# Patient Record
Sex: FEMALE | Race: WHITE | NOT HISPANIC OR LATINO | Employment: FULL TIME | URBAN - NONMETROPOLITAN AREA
[De-identification: names, ages, dates, MRNs, and addresses within clinical notes are randomized per-mention and may not be internally consistent; named-entity substitution may affect disease eponyms.]

---
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
## 2019-12-29 ENCOUNTER — HOSPITAL ENCOUNTER (EMERGENCY)
Facility: HOSPITAL | Age: 19
Discharge: HOME | End: 2019-12-29
Attending: EMERGENCY MEDICINE | Admitting: EMERGENCY MEDICINE

## 2019-12-29 VITALS
BODY MASS INDEX: 40.98 KG/M2 | TEMPERATURE: 98.5 F | OXYGEN SATURATION: 99 % | RESPIRATION RATE: 16 BRPM | HEIGHT: 65 IN | HEART RATE: 78 BPM | DIASTOLIC BLOOD PRESSURE: 79 MMHG | SYSTOLIC BLOOD PRESSURE: 126 MMHG | WEIGHT: 246 LBS

## 2019-12-29 DIAGNOSIS — F41.9 ANXIETY: Primary | ICD-10-CM

## 2019-12-29 PROCEDURE — 99282 EMERGENCY DEPT VISIT SF MDM: CPT

## 2020-11-13 ENCOUNTER — OFFICE VISIT (OUTPATIENT)
Dept: INTERNAL MEDICINE | Facility: CLINIC | Age: 20
End: 2020-11-13

## 2020-11-13 ENCOUNTER — RESULTS ENCOUNTER (OUTPATIENT)
Dept: INTERNAL MEDICINE | Facility: CLINIC | Age: 20
End: 2020-11-13

## 2020-11-13 VITALS
SYSTOLIC BLOOD PRESSURE: 108 MMHG | WEIGHT: 245 LBS | DIASTOLIC BLOOD PRESSURE: 76 MMHG | HEART RATE: 74 BPM | OXYGEN SATURATION: 99 % | BODY MASS INDEX: 41.83 KG/M2 | TEMPERATURE: 97.8 F | HEIGHT: 64 IN

## 2020-11-13 DIAGNOSIS — Z11.59 NEED FOR HEPATITIS C SCREENING TEST: ICD-10-CM

## 2020-11-13 DIAGNOSIS — Z76.89 ENCOUNTER TO ESTABLISH CARE: Primary | ICD-10-CM

## 2020-11-13 DIAGNOSIS — F41.0 PANIC ATTACK: ICD-10-CM

## 2020-11-13 DIAGNOSIS — F41.8 DEPRESSION WITH ANXIETY: ICD-10-CM

## 2020-11-13 DIAGNOSIS — R41.840 DIFFICULTY CONCENTRATING: ICD-10-CM

## 2020-11-13 DIAGNOSIS — R53.82 CHRONIC FATIGUE: ICD-10-CM

## 2020-11-13 DIAGNOSIS — R29.898 RIGHT HAND WEAKNESS: ICD-10-CM

## 2020-11-13 PROBLEM — N92.0 MENORRHAGIA WITH REGULAR CYCLE: Status: ACTIVE | Noted: 2020-11-13

## 2020-11-13 PROBLEM — N93.8 DYSFUNCTIONAL UTERINE BLEEDING: Status: ACTIVE | Noted: 2020-11-13

## 2020-11-13 PROCEDURE — 99204 OFFICE O/P NEW MOD 45 MIN: CPT | Performed by: PHYSICIAN ASSISTANT

## 2020-11-13 RX ORDER — HYDROXYZINE HYDROCHLORIDE 25 MG/1
25 TABLET, FILM COATED ORAL 3 TIMES DAILY PRN
COMMUNITY
End: 2020-12-22

## 2020-11-13 RX ORDER — DULOXETIN HYDROCHLORIDE 30 MG/1
CAPSULE, DELAYED RELEASE ORAL
COMMUNITY
Start: 2020-10-30 | End: 2020-12-14

## 2020-11-13 RX ORDER — IBUPROFEN 200 MG
200 TABLET ORAL EVERY 6 HOURS PRN
COMMUNITY

## 2020-11-13 NOTE — PROGRESS NOTES
Subjective   Hanny Carbone is a 20 y.o. female who presents to [facility].    Chief Complaint   Patient presents with   • [facility]     right hand issue, panic attack       HPI: She is concerned with loss of control of hand strength since school started mid-August. Having trouble writing. She does endorse pressing hard when writing and also writes a lot. No trouble feeling the writing utensil. Worsened since onset. Does not worsen during writing but is present the entire time. No wrist, elbow, shoulder or neck pain. No known injury. She sometimes gets some numbness and tingling in the right forearm during sleep. She is right hand dominant. No trouble with left hand. 2-3 years ago she had numbness in hands and arms when awaking which resolved when she changed her sleep position.     She has been seeing Chanda Last at [facility] for 1 year. Unofficial diagnosis of ADHD currently. Having trouble focusing and completing tasks which is making college challenging. She started guanfacine in early October which made her more panic prone and depressed so then 1 month later she started Cymbalta. 1 week ago (5 days after starting Cymbalta) she had a panic attack and she feels it may be due to Cymbalta. She also had 4-5 day of feeling disassociation prior to the panic attack which resolved following the panic attack. She was also under increased stress at the time of the panic attack. During the panic attack she had around 15 minutes where her whole body started clenching up, her heart was racing, shaking, crying, hyperventilating, and felt like she couldn't breathe. Takes hydroxyzine prn (rarely) for anxiety and sleep.  She has previously tried and failed Prozac, Zoloft, Effexor and Lexapro.  She feels all medications have ever done is make her tired.  She does occasionally feel as though she would be better off dead or hurting herself in someway.    Last labs around 2 years ago.           The  following portions of the patient's history were reviewed and updated as appropriate: She  has a past medical history of Anemia, Anxiety, Depression, IBS (irritable bowel syndrome), and Scoliosis.  She does not have any pertinent problems on file.  She  has no past surgical history on file.  Her family history includes Diabetes in an other family member; Mental illness in an other family member; Migraines in an other family member; Obesity in an other family member; Osteosarcoma in her brother.  She  reports that she has been smoking. She has never used smokeless tobacco. She reports current alcohol use. She reports current drug use. Drug: Marijuana.    Current Outpatient Medications   Medication Sig Dispense Refill   • DULoxetine (CYMBALTA) 30 MG capsule      • guanFACINE HCl ER (INTUNIV) 1 MG tablet sustained-release 24 hour      • hydrOXYzine (ATARAX) 25 MG tablet Take 25 mg by mouth 3 (Three) Times a Day As Needed.     • ibuprofen (ADVIL,MOTRIN) 200 MG tablet Take 200 mg by mouth Every 6 (Six) Hours As Needed for Mild Pain .     • VITAMIN D PO Take  by mouth.       No current facility-administered medications for this visit.        PHQ-9 Depression Screening  Little interest or pleasure in doing things? 3   Feeling down, depressed, or hopeless? 2   Trouble falling or staying asleep, or sleeping too much? 3   Feeling tired or having little energy? 3   Poor appetite or overeating? 3   Feeling bad about yourself - or that you are a failure or have let yourself or your family down? 3   Trouble concentrating on things, such as reading the newspaper or watching television? 3   Moving or speaking so slowly that other people could have noticed? Or the opposite - being so fidgety or restless that you have been moving around a lot more than usual? 3   Thoughts that you would be better off dead, or of hurting yourself in some way? 1   PHQ-9 Total Score 24   If you checked off any problems, how difficult have these problems  made it for you to do your work, take care of things at home, or get along with other people? Extremely dIfficult         Review of Systems  Review of Systems   Constitutional: Positive for fatigue. Negative for activity change, appetite change, chills, diaphoresis, fever, unexpected weight gain and unexpected weight loss.   HENT: Negative for congestion, dental problem, ear pain, mouth sores, postnasal drip, rhinorrhea, sinus pressure, sneezing, sore throat, swollen glands, trouble swallowing and voice change.    Eyes: Negative for blurred vision, double vision, pain, discharge, redness, itching and visual disturbance.   Respiratory: Negative for cough, choking, chest tightness, shortness of breath and wheezing.    Cardiovascular: Negative for chest pain, palpitations and leg swelling.   Gastrointestinal: Negative for abdominal distention, abdominal pain, blood in stool, constipation, diarrhea, nausea, rectal pain, vomiting, GERD and indigestion.   Endocrine: Negative for cold intolerance, heat intolerance, polydipsia, polyphagia and polyuria.   Genitourinary: Negative for breast discharge, breast lump, breast pain, decreased libido, decreased urine volume, difficulty urinating, dysuria, flank pain, frequency, hematuria, pelvic pain, urgency, vaginal bleeding and vaginal pain.   Musculoskeletal: Negative for arthralgias, back pain, gait problem, myalgias and neck pain.   Skin: Negative for color change, rash and skin lesions.   Allergic/Immunologic: Negative for environmental allergies and immunocompromised state.   Neurological: Negative for dizziness, tremors, facial asymmetry, speech difficulty, weakness, light-headedness, numbness, headache, memory problem and confusion.        Loss of  strength     Hematological: Negative for adenopathy. Does not bruise/bleed easily.   Psychiatric/Behavioral: Positive for agitation, decreased concentration, dysphoric mood, sleep disturbance and depressed mood. Negative for  "behavioral problems, hallucinations, self-injury, suicidal ideas, negative for hyperactivity and stress. The patient is nervous/anxious.          Objective    /76   Pulse 74   Temp 97.8 °F (36.6 °C)   Ht 162.6 cm (64.02\")   Wt 111 kg (245 lb)   SpO2 99%   BMI 42.03 kg/m²   Physical Exam  Vitals signs and nursing note reviewed.   Constitutional:       General: She is not in acute distress.     Appearance: She is well-developed. She is obese. She is not diaphoretic.   HENT:      Head: Normocephalic and atraumatic.   Eyes:      General: No visual field deficit or scleral icterus.        Right eye: No discharge.         Left eye: No discharge.      Extraocular Movements: Extraocular movements intact.      Conjunctiva/sclera: Conjunctivae normal.      Pupils: Pupils are equal, round, and reactive to light.   Neck:      Musculoskeletal: Normal range of motion and neck supple. No neck rigidity or muscular tenderness.   Cardiovascular:      Rate and Rhythm: Normal rate and regular rhythm.      Heart sounds: Normal heart sounds. No murmur. No friction rub. No gallop.    Pulmonary:      Effort: Pulmonary effort is normal. No respiratory distress.      Breath sounds: Normal breath sounds. No wheezing, rhonchi or rales.   Chest:      Chest wall: No tenderness.   Abdominal:      General: Bowel sounds are normal.      Palpations: Abdomen is soft.      Tenderness: There is no abdominal tenderness. There is no right CVA tenderness, left CVA tenderness or rebound.   Musculoskeletal: Normal range of motion.         General: No tenderness or deformity.      Right shoulder: She exhibits normal range of motion, no tenderness, no swelling, no crepitus, no laceration, no pain and normal pulse.      Right wrist: She exhibits normal range of motion, no tenderness, no bony tenderness, no swelling, no effusion, no crepitus, no deformity and no laceration.      Cervical back: She exhibits normal range of motion, no tenderness, no " bony tenderness, no deformity, no pain, no spasm and normal pulse.      Right upper arm: She exhibits no tenderness, no swelling and no edema.      Right forearm: She exhibits no tenderness, no bony tenderness, no edema and no laceration.      Right hand: She exhibits normal range of motion, no tenderness, no bony tenderness, normal two-point discrimination, normal capillary refill, no deformity, no laceration and no swelling. Normal sensation noted. Decreased sensation is not present in the ulnar distribution, is not present in the medial distribution and is not present in the radial distribution. Normal strength noted. She exhibits no finger abduction, no thumb/finger opposition and no wrist extension trouble.      Right lower leg: No edema.      Left lower leg: No edema.   Lymphadenopathy:      Cervical: No cervical adenopathy.   Skin:     General: Skin is warm and dry.      Capillary Refill: Capillary refill takes less than 2 seconds.      Findings: No lesion or rash.   Neurological:      General: No focal deficit present.      Mental Status: She is alert and oriented to person, place, and time.      Cranial Nerves: Cranial nerves are intact. No cranial nerve deficit, dysarthria or facial asymmetry.      Sensory: Sensation is intact. No sensory deficit.      Motor: Motor function is intact. No weakness, tremor, atrophy, abnormal muscle tone, seizure activity or pronator drift.      Coordination: Coordination is intact. Romberg sign negative. Coordination normal. Finger-Nose-Finger Test and Heel to Shin Test normal. Rapid alternating movements normal.      Gait: Gait is intact. Gait and tandem walk normal.      Deep Tendon Reflexes: Reflexes normal.      Reflex Scores:       Tricep reflexes are 2+ on the right side and 2+ on the left side.       Bicep reflexes are 2+ on the right side and 2+ on the left side.       Brachioradialis reflexes are 2+ on the right side and 2+ on the left side.  Psychiatric:          Attention and Perception: Attention and perception normal.         Mood and Affect: Mood is anxious and depressed. Mood is not elated. Affect is not angry, tearful or inappropriate.         Speech: Speech normal.         Behavior: Behavior normal. Behavior is cooperative.         Thought Content: Thought content normal.         Cognition and Memory: Cognition and memory normal.         Judgment: Judgment normal.               Assessment/Plan      Diagnosis Plan   1. Encounter to establish care     2. Panic attack  Genetic Testing - Blood,    Ambulatory Referral to Psychiatry     3. Depression with anxiety  TSH    T4, Free    Genetic Testing - Blood,    Ambulatory Referral to Psychiatry     Patient screened positive for depression based on a PHQ-9 score of 24 on 11/13/2020. Follow-up recommendations include: Referral to Mental Health specialist.       4. Difficulty concentrating  T4, Free    Ambulatory Referral to Psychiatry     5. Chronic fatigue  TSH    T4, Free    CBC (No Diff)    Comprehensive Metabolic Panel    Vitamin D 25 Hydroxy     6. Right hand weakness  CBC (No Diff)    Comprehensive Metabolic Panel    Vitamin B12    No focal neurological deficit noted.  Recommended she obtain pencil  for daily use.  If symptoms worsen or persist she will notify me.     7. Need for hepatitis C screening test  Hepatitis C Antibody           Return in about 1 month (around 12/13/2020) for Annual physical.             PRATIK Bland  11/13/2020  09:23 EST

## 2020-11-14 LAB
25(OH)D3+25(OH)D2 SERPL-MCNC: 22.3 NG/ML (ref 30–100)
ALBUMIN SERPL-MCNC: 4.5 G/DL (ref 3.5–5.2)
ALBUMIN/GLOB SERPL: 2.1 G/DL
ALP SERPL-CCNC: 65 U/L (ref 39–117)
ALT SERPL-CCNC: 14 U/L (ref 1–33)
AST SERPL-CCNC: 11 U/L (ref 1–32)
BILIRUB SERPL-MCNC: 0.2 MG/DL (ref 0–1.2)
BUN SERPL-MCNC: 11 MG/DL (ref 6–20)
BUN/CREAT SERPL: 13.4 (ref 7–25)
CALCIUM SERPL-MCNC: 9.6 MG/DL (ref 8.6–10.5)
CHLORIDE SERPL-SCNC: 102 MMOL/L (ref 98–107)
CO2 SERPL-SCNC: 26.7 MMOL/L (ref 22–29)
CREAT SERPL-MCNC: 0.82 MG/DL (ref 0.57–1)
ERYTHROCYTE [DISTWIDTH] IN BLOOD BY AUTOMATED COUNT: 13.4 % (ref 12.3–15.4)
GLOBULIN SER CALC-MCNC: 2.1 GM/DL
GLUCOSE SERPL-MCNC: 89 MG/DL (ref 65–99)
HCT VFR BLD AUTO: 40.2 % (ref 34–46.6)
HCV AB S/CO SERPL IA: 0.1 S/CO RATIO (ref 0–0.9)
HGB BLD-MCNC: 13 G/DL (ref 12–15.9)
MCH RBC QN AUTO: 26.5 PG (ref 26.6–33)
MCHC RBC AUTO-ENTMCNC: 32.3 G/DL (ref 31.5–35.7)
MCV RBC AUTO: 81.9 FL (ref 79–97)
PLATELET # BLD AUTO: 323 10*3/MM3 (ref 140–450)
POTASSIUM SERPL-SCNC: 4.6 MMOL/L (ref 3.5–5.2)
PROT SERPL-MCNC: 6.6 G/DL (ref 6–8.5)
RBC # BLD AUTO: 4.91 10*6/MM3 (ref 3.77–5.28)
SODIUM SERPL-SCNC: 141 MMOL/L (ref 136–145)
T4 FREE SERPL-MCNC: 1.1 NG/DL (ref 0.93–1.7)
TSH SERPL DL<=0.005 MIU/L-ACNC: 1.3 UIU/ML (ref 0.27–4.2)
VIT B12 SERPL-MCNC: 251 PG/ML (ref 211–946)
WBC # BLD AUTO: 8.62 10*3/MM3 (ref 3.4–10.8)

## 2020-11-16 DIAGNOSIS — E55.9 VITAMIN D DEFICIENCY: ICD-10-CM

## 2020-11-16 DIAGNOSIS — E53.8 B12 DEFICIENCY: Primary | ICD-10-CM

## 2020-11-16 RX ORDER — ERGOCALCIFEROL 1.25 MG/1
50000 CAPSULE ORAL WEEKLY
Qty: 4 CAPSULE | Refills: 3 | Status: SHIPPED | OUTPATIENT
Start: 2020-11-16 | End: 2021-01-21 | Stop reason: SDUPTHER

## 2020-12-14 ENCOUNTER — OFFICE VISIT (OUTPATIENT)
Dept: INTERNAL MEDICINE | Facility: CLINIC | Age: 20
End: 2020-12-14

## 2020-12-14 VITALS
DIASTOLIC BLOOD PRESSURE: 68 MMHG | TEMPERATURE: 97.8 F | HEART RATE: 79 BPM | BODY MASS INDEX: 41.32 KG/M2 | OXYGEN SATURATION: 98 % | HEIGHT: 64 IN | WEIGHT: 242 LBS | SYSTOLIC BLOOD PRESSURE: 116 MMHG

## 2020-12-14 DIAGNOSIS — M41.9 SCOLIOSIS OF THORACOLUMBAR SPINE, UNSPECIFIED SCOLIOSIS TYPE: ICD-10-CM

## 2020-12-14 DIAGNOSIS — R29.898 RIGHT HAND WEAKNESS: ICD-10-CM

## 2020-12-14 DIAGNOSIS — K02.9 DENTAL CARIES: ICD-10-CM

## 2020-12-14 DIAGNOSIS — F41.8 DEPRESSION WITH ANXIETY: ICD-10-CM

## 2020-12-14 DIAGNOSIS — K08.89 PAIN, DENTAL: ICD-10-CM

## 2020-12-14 DIAGNOSIS — Z00.00 ANNUAL PHYSICAL EXAM: Primary | ICD-10-CM

## 2020-12-14 DIAGNOSIS — E66.01 CLASS 3 SEVERE OBESITY DUE TO EXCESS CALORIES WITHOUT SERIOUS COMORBIDITY WITH BODY MASS INDEX (BMI) OF 40.0 TO 44.9 IN ADULT (HCC): ICD-10-CM

## 2020-12-14 PROCEDURE — 99395 PREV VISIT EST AGE 18-39: CPT | Performed by: PHYSICIAN ASSISTANT

## 2020-12-14 RX ORDER — DULOXETIN HYDROCHLORIDE 60 MG/1
CAPSULE, DELAYED RELEASE ORAL
COMMUNITY
Start: 2020-11-30 | End: 2020-12-22

## 2020-12-14 NOTE — PROGRESS NOTES
Subjective   Hanny Carbone is a 20 y.o. female and is here for a comprehensive physical exam. The patient reports no new problems.    HPI: Cymbalta dose increased by behavioral health around 2 weeks ago which she is not sure she likes.     She has started vitamin D and B12 as directed since labs were reviewed 1 month ago. Fatigue has improved somewhat.    She reports being diagnosed with scoliosis 3 years ago, she is unsure if she needs to see a specialist. Some pain at times.     Previously discussed right hand  weakness has improved some but not fully.       Health Habits:  Eye exam within last 2 years? Yes.   Dental exam every 6 months? No, she will schedule.   Exercise habits: attempting to be more active   Healthy diet? Attempting, needs work.    The ASCVD Risk score (Ulysses ESTELLA Jr., et al., 2013) failed to calculate for the following reasons:    The 2013 ASCVD risk score is only valid for ages 40 to 79        Do you take any herbs or supplements that were not prescribed by a doctor? yes  Are you taking calcium supplements? No  Are you taking aspirin daily? No     History:  LMP: No LMP recorded.  Menopause: No  Last pap date: none, never sexually active  Family history of breast or ovarian cancer: no         OB History   No obstetric history on file.      reports never being sexually active.        The following portions of the patient's history were reviewed and updated as appropriate: She  has a past medical history of Anemia, Anxiety, Depression, IBS (irritable bowel syndrome), and Scoliosis.  She does not have any pertinent problems on file.  She  has no past surgical history on file.  Her family history includes Diabetes in an other family member; Mental illness in an other family member; Migraines in an other family member; Obesity in an other family member; Osteosarcoma in her brother.  She  reports that she has been smoking. She has never used smokeless tobacco. She reports current alcohol use.  She reports current drug use. Drug: Marijuana.  Current Outpatient Medications   Medication Sig Dispense Refill   • cyanocobalamin ([facility] Vitamin B-12) 1000 MCG tablet Take 1 tablet by mouth Daily. 30 tablet 11   • guanFACINE HCl ER (INTUNIV) 1 MG tablet sustained-release 24 hour      • hydrOXYzine (ATARAX) 25 MG tablet Take 25 mg by mouth 3 (Three) Times a Day As Needed.     • ibuprofen (ADVIL,MOTRIN) 200 MG tablet Take 200 mg by mouth Every 6 (Six) Hours As Needed for Mild Pain .     • vitamin D (ERGOCALCIFEROL) 1.25 MG (99493 UT) capsule capsule Take 1 capsule by mouth 1 (One) Time Per Week. 4 capsule 3   • VITAMIN D PO Take  by mouth.     • DULoxetine (CYMBALTA) 60 MG capsule        No current facility-administered medications for this visit.        Review of Systems  Do you have pain that bothers you in your daily life? Not every day    Review of Systems   Constitutional: Positive for fatigue. Negative for activity change, appetite change, chills, diaphoresis, fever, unexpected weight gain and unexpected weight loss.   HENT: Positive for dental problem. Negative for congestion, ear pain, mouth sores, postnasal drip, rhinorrhea, sinus pressure, sneezing, sore throat, swollen glands, trouble swallowing and voice change.    Eyes: Negative for blurred vision, double vision, pain, discharge, redness, itching and visual disturbance.   Respiratory: Negative for cough, choking, chest tightness, shortness of breath and wheezing.    Cardiovascular: Negative for chest pain, palpitations and leg swelling.   Gastrointestinal: Negative for abdominal distention, abdominal pain, blood in stool, constipation, diarrhea, nausea, rectal pain, vomiting, GERD and indigestion.   Endocrine: Negative for cold intolerance, heat intolerance, polydipsia, polyphagia and polyuria.   Genitourinary: Negative for breast discharge, breast lump, breast pain, decreased libido, decreased urine volume, difficulty urinating, dysuria, flank pain,  "frequency, hematuria, pelvic pain, urgency, vaginal bleeding and vaginal pain.   Musculoskeletal: Positive for arthralgias and back pain. Negative for gait problem, myalgias and neck pain.   Skin: Negative for color change, rash and skin lesions.   Allergic/Immunologic: Negative for environmental allergies and immunocompromised state.   Neurological: Positive for weakness (right hand,  strength). Negative for dizziness, tremors, facial asymmetry, speech difficulty, light-headedness, numbness, headache, memory problem and confusion.        Loss of  strength     Hematological: Negative for adenopathy. Does not bruise/bleed easily.   Psychiatric/Behavioral: Positive for agitation, decreased concentration, dysphoric mood, sleep disturbance, depressed mood and stress. Negative for behavioral problems, hallucinations, self-injury, suicidal ideas and negative for hyperactivity. The patient is nervous/anxious.          Objective   /68   Pulse 79   Temp 97.8 °F (36.6 °C)   Ht 162.6 cm (64.02\")   Wt 110 kg (242 lb)   SpO2 98%   BMI 41.52 kg/m²     Physical Exam  Vitals signs and nursing note reviewed.   Constitutional:       General: She is not in acute distress.     Appearance: She is well-developed. She is morbidly obese. She is not ill-appearing, toxic-appearing or diaphoretic.      Interventions: Face mask in place.   HENT:      Head: Normocephalic and atraumatic.      Right Ear: External ear normal.      Left Ear: External ear normal.   Eyes:      General: No scleral icterus.     Conjunctiva/sclera: Conjunctivae normal.      Pupils: Pupils are equal, round, and reactive to light.   Neck:      Musculoskeletal: Normal range of motion and neck supple. No neck rigidity or muscular tenderness.      Thyroid: No thyromegaly.   Cardiovascular:      Rate and Rhythm: Normal rate and regular rhythm.      Heart sounds: Normal heart sounds. No murmur. No friction rub. No gallop.    Pulmonary:      Effort: " Pulmonary effort is normal. No respiratory distress.      Breath sounds: Normal breath sounds. No wheezing or rales.   Chest:      Chest wall: No tenderness.   Abdominal:      General: Bowel sounds are normal. There is no distension.      Palpations: Abdomen is soft. There is no mass.      Tenderness: There is no abdominal tenderness. There is no right CVA tenderness, left CVA tenderness or rebound.   Musculoskeletal:         General: Deformity present. No swelling or tenderness.      Right lower leg: No edema.      Left lower leg: No edema.      Comments: Scoliosis noted.   Double jointed in many joints.   Lymphadenopathy:      Cervical: No cervical adenopathy.   Skin:     General: Skin is warm and dry.      Capillary Refill: Capillary refill takes less than 2 seconds.      Coloration: Skin is not pale.      Findings: No lesion or rash.   Neurological:      General: No focal deficit present.      Mental Status: She is alert and oriented to person, place, and time.      Cranial Nerves: No cranial nerve deficit.      Sensory: No sensory deficit.      Gait: Gait normal.      Deep Tendon Reflexes: Reflexes normal.   Psychiatric:         Mood and Affect: Mood normal.         Behavior: Behavior normal. Behavior is cooperative.         Thought Content: Thought content normal.         Judgment: Judgment normal.            Assessment/Plan   Healthy female exam.     1.    Diagnosis Plan   1. Annual physical exam     2. Class 3 severe obesity due to excess calories without serious comorbidity with body mass index (BMI) of 40.0 to 44.9 in adult (CMS/[facility])  Patient's Body mass index is 41.52 kg/m². BMI is above normal parameters. Recommendations include: exercise counseling and nutrition counseling.     3. Depression with anxiety  Managed by behavioral health.      4. Dental caries  Ambulatory Referral to Dentistry   5. Pain, dental  Ambulatory Referral to Dentistry     6. Right hand weakness  Ambulatory Referral to Occupational  Therapy     7. Scoliosis of thoracolumbar spine, unspecified scoliosis type  XR spine scoliosis ap standing         2. Patient Counseling:  --Nutrition: Stressed importance of moderation in sodium/caffeine intake, saturated fat and cholesterol, caloric balance, sufficient intake of fresh fruits, vegetables, fiber, calcium, iron, and 1 g folate supplementation if of childbearing age.   --Discussed the issue of calcium supplement, and the daily use of baby aspirin if applicable.             --Mammogram recommended every 2 years from age 40-49 and yearly beginning at age 50.  --Exercise: Stressed the importance of regular exercise.   --Substance Abuse: Discussed cessation/primary prevention of tobacco (if applicable), alcohol, or other drug use (if applicable); driving or other dangerous activities under the influence; availability of treatment for abuse.    --Sexuality: Discussed sexually transmitted diseases, partner selection, use of condoms, avoidance of unintended pregnancy  and contraceptive alternatives.   --Injury prevention: Discussed safety belts, safety helmets, smoke detector, smoking near bedding or upholstery.   --Dental health: Discussed importance of regular tooth brushing, flossing, and dental visits every 6 months.  --Immunizations reviewed.  --Discussed benefits of screening colonoscopy (if applicable).  --After hours service discussed with patient.    3. Discussed the patient's BMI with her.  The BMI is above average; BMI management plan is completed  4. Return in about 1 year (around 12/14/2021) for Annual physical.         PRATIK Bland  12/14/2020  16:32 EST

## 2020-12-15 ENCOUNTER — HOSPITAL ENCOUNTER (OUTPATIENT)
Dept: GENERAL RADIOLOGY | Facility: HOSPITAL | Age: 20
Discharge: HOME | End: 2020-12-15
Admitting: PHYSICIAN ASSISTANT

## 2020-12-15 PROCEDURE — 72081 X-RAY EXAM ENTIRE SPI 1 VW: CPT

## 2020-12-22 ENCOUNTER — OFFICE VISIT (OUTPATIENT)
Dept: BEHAVIORAL HEALTH | Facility: CLINIC | Age: 20
End: 2020-12-22

## 2020-12-22 VITALS
BODY MASS INDEX: 41.32 KG/M2 | DIASTOLIC BLOOD PRESSURE: 70 MMHG | WEIGHT: 242 LBS | OXYGEN SATURATION: 97 % | TEMPERATURE: 97.7 F | SYSTOLIC BLOOD PRESSURE: 116 MMHG | HEIGHT: 64 IN | HEART RATE: 82 BPM

## 2020-12-22 DIAGNOSIS — F41.9 ANXIETY: ICD-10-CM

## 2020-12-22 DIAGNOSIS — F90.2 ADHD (ATTENTION DEFICIT HYPERACTIVITY DISORDER), COMBINED TYPE: Primary | ICD-10-CM

## 2020-12-22 PROCEDURE — 90792 PSYCH DIAG EVAL W/MED SRVCS: CPT | Performed by: NURSE PRACTITIONER

## 2020-12-22 RX ORDER — FEXOFENADINE HYDROCHLORIDE 60 MG/1
60 TABLET, FILM COATED ORAL DAILY
COMMUNITY

## 2020-12-22 RX ORDER — METHYLPHENIDATE HYDROCHLORIDE 10 MG/1
TABLET ORAL
Qty: 90 TABLET | Refills: 0 | Status: SHIPPED | OUTPATIENT
Start: 2020-12-22 | End: 2021-01-21 | Stop reason: SDDI

## 2020-12-22 RX ORDER — DULOXETIN HYDROCHLORIDE 30 MG/1
30 CAPSULE, DELAYED RELEASE ORAL DAILY
Qty: 30 CAPSULE | Refills: 2 | Status: SHIPPED | OUTPATIENT
Start: 2020-12-22 | End: 2021-02-25

## 2020-12-22 NOTE — PROGRESS NOTES
Patient Name: Hanny Carbone  MRN: [id number]   :  2000     Referring Physician: Alexsandra Abreu PA    Chief Complaint:     ICD-10-CM ICD-9-CM   1. ADHD (attention deficit hyperactivity disorder), combined type  F90.2 314.01   2. Anxiety  F41.9 300.00       HPI:   Hanny Carbone is a 20 y.o. female who is here today for initial evaluation of ADHD and Anxiety .  Patient states she has a therapist she sees weekly.  She was also seeing somebody for medication management however they could not go farther in her treatment due to their licensing.  Patient is very frustrated and feels she would feel better if she had a specific diagnosis.  Patient has tried Prozac, Zoloft, Lexapro, Effexor, and guanfacine.  Patient is currently on Cymbalta 60 mg however states she feels worse now than it was increased to 60 from 30.  Does not have good grades.  Falls behind very easily.  Does not get things completed.  Can try to listen and misses part of the lecture.  Mind wanders a lot.    Past Medical History:   Past Medical History:   Diagnosis Date   • Anemia    • Anxiety    • Depression    • IBS (irritable bowel syndrome)    • Scoliosis        Past Surgical History:   History reviewed. No pertinent surgical history.    Social History:   Social History     Socioeconomic History   • Marital status: Single     Spouse name: Not on file   • Number of children: Not on file   • Years of education: Not on file   • Highest education level: Not on file   Tobacco Use   • Smoking status: Current Every Day Smoker   • Smokeless tobacco: Never Used   • Tobacco comment: vape, planning to quit   Substance and Sexual Activity   • Alcohol use: Yes     Frequency: Monthly or less     Drinks per session: 1 or 2     Binge frequency: Never   • Drug use: Yes     Types: Marijuana   • Sexual activity: Never       Family History:  Family History   Problem Relation Age of Onset   • Osteosarcoma Brother    • Diabetes Other    • Mental illness Other    •  Migraines Other    • Obesity Other        Allergy:  No Known Allergies    Current Medications:   Current Outpatient Medications   Medication Sig Dispense Refill   • cyanocobalamin ([facility] Vitamin B-12) 1000 MCG tablet Take 1 tablet by mouth Daily. 30 tablet 11   • fexofenadine (ALLEGRA) 60 MG tablet Take 60 mg by mouth Daily.     • ibuprofen (ADVIL,MOTRIN) 200 MG tablet Take 200 mg by mouth Every 6 (Six) Hours As Needed for Mild Pain .     • vitamin D (ERGOCALCIFEROL) 1.25 MG (86031 UT) capsule capsule Take 1 capsule by mouth 1 (One) Time Per Week. 4 capsule 3   • DULoxetine (Cymbalta) 30 MG capsule Take 1 capsule by mouth Daily. 30 capsule 2   • methylphenidate (RITALIN) 10 MG tablet Take 10 mg orally every morning, mid day and late afternoon. 90 tablet 0     No current facility-administered medications for this visit.        Lab Results:   Office Visit on 11/13/2020   Component Date Value Ref Range Status   • TSH 11/13/2020 1.300  0.270 - 4.200 uIU/mL Final   • Free T4 11/13/2020 1.10  0.93 - 1.70 ng/dL Final    Results may be falsely increased if patient taking Biotin.   • WBC 11/13/2020 8.62  3.40 - 10.80 10*3/mm3 Final   • RBC 11/13/2020 4.91  3.77 - 5.28 10*6/mm3 Final   • Hemoglobin 11/13/2020 13.0  12.0 - 15.9 g/dL Final   • Hematocrit 11/13/2020 40.2  34.0 - 46.6 % Final   • MCV 11/13/2020 81.9  79.0 - 97.0 fL Final   • MCH 11/13/2020 26.5* 26.6 - 33.0 pg Final   • MCHC 11/13/2020 32.3  31.5 - 35.7 g/dL Final   • RDW 11/13/2020 13.4  12.3 - 15.4 % Final   • Platelets 11/13/2020 323  140 - 450 10*3/mm3 Final   • Glucose 11/13/2020 89  65 - 99 mg/dL Final   • BUN 11/13/2020 11  6 - 20 mg/dL Final   • Creatinine 11/13/2020 0.82  0.57 - 1.00 mg/dL Final   • eGFR Non  Am 11/13/2020 88  >60 mL/min/1.73 Final   • eGFR African Am 11/13/2020 107  >60 mL/min/1.73 Final   • BUN/Creatinine Ratio 11/13/2020 13.4  7.0 - 25.0 Final   • Sodium 11/13/2020 141  136 - 145 mmol/L Final   • Potassium 11/13/2020 4.6  3.5 -  5.2 mmol/L Final   • Chloride 11/13/2020 102  98 - 107 mmol/L Final   • Total CO2 11/13/2020 26.7  22.0 - 29.0 mmol/L Final   • Calcium 11/13/2020 9.6  8.6 - 10.5 mg/dL Final   • Total Protein 11/13/2020 6.6  6.0 - 8.5 g/dL Final   • Albumin 11/13/2020 4.50  3.50 - 5.20 g/dL Final   • Globulin 11/13/2020 2.1  gm/dL Final   • A/G Ratio 11/13/2020 2.1  g/dL Final   • Total Bilirubin 11/13/2020 0.2  0.0 - 1.2 mg/dL Final   • Alkaline Phosphatase 11/13/2020 65  39 - 117 U/L Final   • AST (SGOT) 11/13/2020 11  1 - 32 U/L Final   • ALT (SGPT) 11/13/2020 14  1 - 33 U/L Final   • 25 Hydroxy, Vitamin D 11/13/2020 22.3* 30.0 - 100.0 ng/ml Final    Comment: Results may be falsely increased if patient taking Biotin.  Reference Range for Total Vitamin D 25(OH)  Deficiency <20.0 ng/mL  Insufficiency 21-29 ng/mL  Sufficiency  ng/mL  Toxicity >100 ng/ml     • Vitamin B-12 11/13/2020 251  211 - 946 pg/mL Final    Results may be falsely increased if patient taking Biotin.   • Hep C Virus Ab 11/13/2020 0.1  0.0 - 0.9 s/co ratio Final    Comment:                                   Negative:     < 0.8                               Indeterminate: 0.8 - 0.9                                    Positive:     > 0.9   The CDC recommends that a positive HCV antibody result   be followed up with a HCV Nucleic Acid Amplification   test ([id number]).         Review of Symptoms:   Review of Systems   Constitutional: Negative for activity change, appetite change, fatigue, unexpected weight gain and unexpected weight loss.   Respiratory: Negative for shortness of breath and wheezing.    Gastrointestinal: Negative for constipation, diarrhea, nausea and vomiting.   Musculoskeletal: Negative for gait problem.   Skin: Negative for dry skin and rash.   Neurological: Negative for dizziness, speech difficulty, weakness, light-headedness, headache, memory problem and confusion.   Psychiatric/Behavioral: Positive for decreased concentration, positive for  "hyperactivity, depressed mood and stress. Negative for agitation, behavioral problems, dysphoric mood, hallucinations, self-injury, sleep disturbance and suicidal ideas. The patient is nervous/anxious.        Physical Exam:   Physical Exam  Vitals signs and nursing note reviewed.   Constitutional:       General: She is not in acute distress.     Appearance: She is well-developed. She is not diaphoretic.   HENT:      Head: Normocephalic and atraumatic.   Eyes:      Conjunctiva/sclera: Conjunctivae normal.   Neck:      Musculoskeletal: Full passive range of motion without pain and normal range of motion.   Cardiovascular:      Rate and Rhythm: Normal rate.   Pulmonary:      Effort: Pulmonary effort is normal. No respiratory distress.   Musculoskeletal: Normal range of motion.   Skin:     General: Skin is warm and dry.   Neurological:      Mental Status: She is alert and oriented to person, place, and time.   Psychiatric:         Mood and Affect: Mood is anxious and depressed. Affect is not labile, blunt, angry or inappropriate.         Speech: Speech is not rapid and pressured or tangential.         Behavior: Behavior normal. Behavior is not agitated, slowed, aggressive, withdrawn, hyperactive or combative. Behavior is cooperative.         Thought Content: Thought content normal. Thought content is not paranoid or delusional. Thought content does not include homicidal or suicidal ideation. Thought content does not include homicidal or suicidal plan.         Judgment: Judgment normal.       Blood pressure 116/70, pulse 82, temperature 97.7 °F (36.5 °C), height 162.6 cm (64\"), weight 110 kg (242 lb), SpO2 97 %.  Body mass index is 41.54 kg/m².     Mental Status Exam:   Appearance: appropriate  Hygiene:   good  Cooperation:  Cooperative  Eye Contact:  Fair  Psychomotor Behavior:  Restless  Mood:anxious  Affect:  Appropriate  Hopelessness: Denies  Speech:  Rapid  Thought Process:  Flight of ieas  Thought Content:  " Normal  Suicidal:  None  Homicidal:  None  Hallucinations:  None  Delusion:  None  Memory:  Intact  Orientation:  Person, Place, Time and Situation  Reliability:  good  Insight:  Good  Judgement:  Good  Impulse Control:  Good  Physical/Medical Issues:  No     PHQ-9 Depression Screening  Little interest or pleasure in doing things? 1   Feeling down, depressed, or hopeless? 1   Trouble falling or staying asleep, or sleeping too much? 2   Feeling tired or having little energy? 1   Poor appetite or overeating? 2   Feeling bad about yourself - or that you are a failure or have let yourself or your family down? 3   Trouble concentrating on things, such as reading the newspaper or watching television? 2   Moving or speaking so slowly that other people could have noticed? Or the opposite - being so fidgety or restless that you have been moving around a lot more than usual? 0   Thoughts that you would be better off dead, or of hurting yourself in some way? 1   PHQ-9 Total Score 13   If you checked off any problems, how difficult have these problems made it for you to do your work, take care of things at home, or get along with other people?        Assessment/Plan:   Diagnoses and all orders for this visit:    1. ADHD (attention deficit hyperactivity disorder), combined type (Primary)  -     methylphenidate (RITALIN) 10 MG tablet; Take 10 mg orally every morning, mid day and late afternoon.  Dispense: 90 tablet; Refill: 0  -     Compliance Drug Analysis, Ur - Urine, Clean Catch    2. Anxiety  -     DULoxetine (Cymbalta) 30 MG capsule; Take 1 capsule by mouth Daily.  Dispense: 30 capsule; Refill: 2    Decrease Cymbalta to 30 mg p.o. daily.  Based on assessment and ADHD flowsheet is highly likely patient has ADHD combined type.  We will start Ritalin 10 mg 3 times a day due to patient's long workday.    A psychological evaluation was conducted in order to assess past and current level of functioning. Areas assessed included,  but were not limited to: perception of social support, perception of ability to face and deal with challenges in life (positive functioning), anxiety symptoms, depressive symptoms, perspective on beliefs/belief system, coping skills for stress, intelligence level,  Therapeutic rapport was established. Interventions conducted today were geared towards incorporating medication management along with support for continued therapy. Education was also provided as to the med management with this provider and what to expect in subsequent sessions.    We discussed risks, benefits,goals and side effects of the above medication and the patient was agreeable with the plan.Patient was educated on the importance of compliance with treatment and follow-up appointments. Patient is aware to contact the [facility] with any worsening of symptoms. To call for questions or concerns and return early if necessary. Patent is agreeable to go to the Emergency Department or call 911 should they begin SI/HI.     Treatment Plan:   Discussed risks, benefits, and alternatives of medication. Encouraged healthy habits (eating, exercise and sleep). Call if any questions or problems arise. Medication reconciled. Controlled substance monitoring report reviewed. Provided psychoeducation.. Discussed coping strategies and current stressors. Set appropriate boundaries and limits for patient's well-being. Use distraction techniques to improve symptoms. Access support networks.      Return in about 4 weeks (around 1/19/2021) for Follow Up 30 min.    SEBLE Agustin

## 2020-12-28 LAB — DRUGS UR: NORMAL

## 2021-01-12 ENCOUNTER — TREATMENT (OUTPATIENT)
Dept: PHYSICAL THERAPY | Facility: CLINIC | Age: 21
End: 2021-01-12

## 2021-01-12 DIAGNOSIS — R27.8 DECREASED COORDINATION: ICD-10-CM

## 2021-01-12 DIAGNOSIS — R29.898 DECREASED GRIP STRENGTH OF RIGHT HAND: Primary | ICD-10-CM

## 2021-01-12 PROCEDURE — 97166 OT EVAL MOD COMPLEX 45 MIN: CPT | Performed by: OCCUPATIONAL THERAPIST

## 2021-01-12 PROCEDURE — 97530 THERAPEUTIC ACTIVITIES: CPT | Performed by: OCCUPATIONAL THERAPIST

## 2021-01-12 NOTE — PROGRESS NOTES
"Occupational Therapy Initial Evaluation and Plan of Care      Patient: Hanny Carbone   : 2000  Diagnosis/ICD-10 Code:  Decreased  strength of right hand [R29.898]  Referring practitioner: PRATIK Bland  Date of Initial Visit: Type: THERAPY  Noted: 2021  Today's Date: 2021  Patient seen for 1 sessions      Subjective:     Subjective Questionnaire: 9HPT R: 17.09 L: 16.15  Demonstrates appropriate translation and in-hand manipulation bilaterally along with speed and targeting.      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 She states that HW grasp has declined since before COVID and now feels like she is having difficulty grasping pen/pencil and lacks control. It has affected pt's ability to keep up with class work and causes significant fatigue. Pt states having N/T that began several years ago but notes only noticing when sleeping in certain positions. Pt notes no other decline in FM dexterity activities but would like to improve speed, accuracy and legibility with HW.     Quality of life: good    Pain  No pain reported    Social Support  Lives in: apartment    Hand dominance: right    Patient Goals  Patient goal: improve HW         Objective          Active Range of Motion   Left Shoulder   Flexion: WFL    Right Shoulder   Flexion: WFL    Left Elbow   Flexion: WFL  Extension: WFL  Forearm supination: WFL  Forearm pronation: WFL    Right Elbow   Flexion: WFL  Extension: WFL  Forearm supination: WFL  Forearm pronation: WFL    Left Wrist   Wrist flexion: WFL  Wrist extension: WFL  Radial deviation: WFL  Ulnar deviation: WFL      Right Wrist   Wrist flexion: WFL  Wrist extension: WFl  Radial deviation: WFL  Ulnar deviation: WFL    Left Thumb   Opposition: WFL bilaterally; noted mild hyperextension and overall \"low tone\" appearance to hands    Additional Active Range of Motion Details  WFL "     Strength/Myotome Testing     Left Wrist/Hand      (2nd hand position)     Trial 1: 45 lbs    Trial 2: 40 lbs    Trial 3: 35 lbs    Average: 40 lbs    Thumb Strength  Key/Lateral Pinch     Trial 1: 10 lbs    Trial 2: 12 lbs    Trial 3: 11 lbs    Average: 11 lbs    Right Wrist/Hand      (2nd hand position)     Trial 1: 35 lbs    Trial 2: 35 lbs    Trial 3: 45 lbs    Average: 38.33 lbs    Thumb Strength   Key/Lateral Pinch     Trial 1: 12 lbs    Trial 2: 12 lbs    Trial 3: 12 lbs    Average: 12 lbs    Additional Strength Details  Notes intermittent N/T during sleep/upon awakening in digit IV and V only    Ambulation     Comments   Pt reports no difficulty w/any ADL tasks or other dexterity tasks other than w/handwriting. Pt demonstrates hyperextension of DIP joints in digit II and III during grasp pattern.         OT Neuro         OT Exercises     Row Name 01/12/21 1300             Exercise 1    Exercise Name 1  OT IE Completed per consult  -ST         Exercise 2    Exercise Name 2  HW activity with focus on correct dynamic tripod grasp; addressed over gripping and varying pressure points with adjusting via shading   -ST         Exercise 3    Exercise Name 3  medium soft tputty exercises   -ST         Exercise 4    Exercise Name 4  discussed varying pencil  options   -ST        User Key  (r) = Recorded By, (t) = Taken By, (c) = Cosigned By    Initials Name Provider Type    Amberly Ruth, OTR Occupational Therapist           Assessment & Plan     Assessment  Impairments: abnormal coordination and lacks appropriate home exercise program  Assessment details: Pt presents with impairments including decreased dominant R  strength and coordiantion. This impacts HW and school-work, causing pt to fall behind when note taking and performing short-answer essays. Pt taking multiple math courses, requiring repetitive drawing and writing. Pt to benefit from skilled OT services to address these areas and  promote increased satisfaction, independence and ease with hand use.   Prognosis: good  Functional Limitations: unable to perform repetitive tasks  Goals  Plan Goals:   Pt will increase R/L  strength by 8 # by 8 wks to demonstrate improved strength and function for daily tasks.     Pt will increase lateral  strength B hands by 5 # by 8 wks to demonstrate improved strength and function for daily tasks.     Pt will be independent with hand strengthening HEP to increase independence with daily tasks by 4 wks.             Plan  Planned therapy interventions: motor coordination training, neuromuscular re-education, strengthening, therapeutic activities, IADL retraining, home exercise program, fine motor coordination training and ADL retraining  Treatment plan discussed with: patient  Plan details: Est OT POC and goals to reflect above needs and promote increased strength, coordination and IADL performance.         Timed:  Manual Therapy:    0     mins  23361;  Therapeutic Exercise:    0     mins  64415;     Neuromuscular Janessa:    0    mins  97492;    Therapeutic Activity:     10     mins  53840;     Self-Care/ADL     0     mins  65096;   Sensory Int. Tech      0     mins 37350;  Ultrasound:     0     mins  83799;    Electrical Stimulation:    0     mins  15605 ( );    Untimed:  Electrical Stimulation:    0     mins  80100 ( );    Timed Treatment:   10   mins   Total Treatment:     45   mins    OT Signature: Amberly Shepard MS, OTR/L, CDP  [place] License #: [id number]  DATE TREATMENT INITIATED: 1/12/2021    Initial Certification Certification Period: 4/12/2021  I certify that the therapy services are furnished while this patient is under my care. The services outlined above are required by this patient and will be reviewed every 90 days.     Physician Signature: __________________________________  Alexsandra Abreu PA    Please sign and return via fax to [phone]  Thank you,   [facility]  Occupational Therapy

## 2021-01-19 ENCOUNTER — TREATMENT (OUTPATIENT)
Dept: PHYSICAL THERAPY | Facility: CLINIC | Age: 21
End: 2021-01-19

## 2021-01-19 DIAGNOSIS — R29.898 DECREASED GRIP STRENGTH OF RIGHT HAND: Primary | ICD-10-CM

## 2021-01-19 DIAGNOSIS — R27.8 DECREASED COORDINATION: ICD-10-CM

## 2021-01-19 PROCEDURE — 97530 THERAPEUTIC ACTIVITIES: CPT | Performed by: OCCUPATIONAL THERAPIST

## 2021-01-19 NOTE — PROGRESS NOTES
"Occupational Therapy Daily Progress Note  Visit: 2  Date of Initial Visit: Type: THERAPY  Noted: 2021      Patient: Hanny Carbone   : 2000  Diagnosis/ICD-10 Code:  Decreased  strength of right hand [R29.898]  Referring practitioner: PRATIK Bland  Date of Initial Visit: Type: THERAPY  Noted: 2021  Today's Date: 2021  Patient seen for 2 sessions      Subjective:   Patient reports: no complaints; \"I brought those  my GP told me to purchase. I'm curious what you think\"  Pain: 0/10  Subjective Questionnaire: n/a  Clinical Progress: improved  Home Program Compliance: Yes  Treatment has included: therapeutic activity    Subjective   Objective     OT Neuro         OT Exercises     Row Name 21 0845             Precautions    Existing Precautions/Restrictions  no known precautions/restrictions  -ST         Exercise 1    Exercise Name 1  exploration and trialing of varying pencil grasps finding that triangle grasp most effective for appropriate positioning   -ST         Exercise 2    Exercise Name 2  improving legibility via increasing size and symmetry of lettering (doubling size); notable improvement  -ST         Exercise 3    Exercise Name 3  practice with correct letter formation of \"g,\" \"s,\" \"a,\" \"d,\" and \"b\"  -ST        User Key  (r) = Recorded By, (t) = Taken By, (c) = Cosigned By    Initials Name Provider Type    Amberly Ruth OTR Occupational Therapist           Assessment & Plan     Assessment  Assessment details: Explored varying types of pencil grasps however found that majority of grasps further increased pt's DIP hyperflexion when HW d/t weakness. Remaining  too bulky and uncomfortable. Trialed basic triangle pencil  that encourages proper dynamic tripod grasp but aids pt with neutral DIP or flexion at DIP joint. Progressed to legibility and symmetry of lettering by increasing size.     Plan  Plan details: Continue w/POC; pt to practice 50/50% of " time w/w/o triangle pencil grasp; focus on neutral for slight flexion of DIP joint digit II.         Visit Diagnoses:    ICD-10-CM ICD-9-CM   1. Decreased  strength of right hand  R29.898 729.89   2. Decreased coordination  R27.8 781.3             Timed:  Manual Therapy:    0     mins  85990;  Therapeutic Exercise:    0     mins  69844;     Neuromuscular Janessa:    0    mins  79822;    Therapeutic Activity:     42     mins  30538;     Self-Care/ADL     0     mins  02622;   Sensory Int. Tech      0     mins 61210;  Ultrasound:     0     mins  09655;    Electrical Stimulation:    0     mins  87696 ( );    Untimed:  Electrical Stimulation:    0     mins  41421 ( );    Timed Treatment:   42   mins   Total Treatment:     42   mins    OT Signature: Amberly Shepard MS, OTR/L, CDP  [place] License #: [id number]

## 2021-01-21 ENCOUNTER — OFFICE VISIT (OUTPATIENT)
Dept: BEHAVIORAL HEALTH | Facility: CLINIC | Age: 21
End: 2021-01-21

## 2021-01-21 VITALS
SYSTOLIC BLOOD PRESSURE: 118 MMHG | WEIGHT: 239 LBS | OXYGEN SATURATION: 97 % | HEART RATE: 94 BPM | DIASTOLIC BLOOD PRESSURE: 70 MMHG | HEIGHT: 64 IN | TEMPERATURE: 97.4 F | BODY MASS INDEX: 40.8 KG/M2

## 2021-01-21 DIAGNOSIS — E55.9 VITAMIN D DEFICIENCY: ICD-10-CM

## 2021-01-21 DIAGNOSIS — F41.9 ANXIETY: ICD-10-CM

## 2021-01-21 DIAGNOSIS — F90.2 ADHD (ATTENTION DEFICIT HYPERACTIVITY DISORDER), COMBINED TYPE: Primary | ICD-10-CM

## 2021-01-21 DIAGNOSIS — E53.8 B12 DEFICIENCY: ICD-10-CM

## 2021-01-21 PROCEDURE — 99213 OFFICE O/P EST LOW 20 MIN: CPT | Performed by: NURSE PRACTITIONER

## 2021-01-21 NOTE — PROGRESS NOTES
Patient Name: Hanny Carbone  MRN: [id number]   :  2000     Chief Complaint:      ICD-10-CM ICD-9-CM   1. ADHD (attention deficit hyperactivity disorder), combined type  F90.2 314.01   2. Anxiety  F41.9 300.00       History of Present Illness: Hanny Carbone is a 20 y.o. female is here today for medication management follow up.  Patient states she notices a crash in between her doses of Ritalin.  Makes her feel very irritable and depressed.  Would like to try something different.    The following portions of the patient's history were reviewed and updated as appropriate: allergies, current medications, past family history, past medical history, past social history, past surgical history and problem list.    Review of Systems;;  Review of Systems   Constitutional: Negative for activity change, appetite change, fatigue, unexpected weight gain and unexpected weight loss.   Respiratory: Negative for shortness of breath and wheezing.    Gastrointestinal: Negative for constipation, diarrhea, nausea and vomiting.   Musculoskeletal: Negative for gait problem.   Skin: Negative for dry skin and rash.   Neurological: Negative for dizziness, speech difficulty, weakness, light-headedness, headache, memory problem and confusion.   Psychiatric/Behavioral: Positive for decreased concentration and stress. Negative for agitation, behavioral problems, dysphoric mood, hallucinations, self-injury, sleep disturbance, suicidal ideas, negative for hyperactivity and depressed mood. The patient is not nervous/anxious.        Physical Exam;;  Physical Exam  Vitals signs and nursing note reviewed.   Constitutional:       General: She is not in acute distress.     Appearance: She is well-developed. She is not diaphoretic.   HENT:      Head: Normocephalic and atraumatic.   Eyes:      Conjunctiva/sclera: Conjunctivae normal.   Neck:      Musculoskeletal: Full passive range of motion without pain and normal range of motion.   Cardiovascular:     "  Rate and Rhythm: Normal rate.   Pulmonary:      Effort: Pulmonary effort is normal. No respiratory distress.   Musculoskeletal: Normal range of motion.   Skin:     General: Skin is warm and dry.   Neurological:      Mental Status: She is alert and oriented to person, place, and time.   Psychiatric:         Mood and Affect: Mood is not anxious or depressed. Affect is not labile, blunt, angry or inappropriate.         Speech: Speech is not rapid and pressured or tangential.         Behavior: Behavior normal. Behavior is not agitated, slowed, aggressive, withdrawn, hyperactive or combative. Behavior is cooperative.         Thought Content: Thought content normal. Thought content is not paranoid or delusional. Thought content does not include homicidal or suicidal ideation. Thought content does not include homicidal or suicidal plan.         Judgment: Judgment normal.       Blood pressure 118/70, pulse 94, temperature 97.4 °F (36.3 °C), height 162.6 cm (64\"), weight 108 kg (239 lb), SpO2 97 %.  Body mass index is 41.02 kg/m².    Current Medications;;    Current Outpatient Medications:   •  cyanocobalamin ([facility] Vitamin B-12) 1000 MCG tablet, Take 1 tablet by mouth Daily., Disp: 30 tablet, Rfl: 11  •  DULoxetine (Cymbalta) 30 MG capsule, Take 1 capsule by mouth Daily., Disp: 30 capsule, Rfl: 2  •  fexofenadine (ALLEGRA) 60 MG tablet, Take 60 mg by mouth Daily., Disp: , Rfl:   •  ibuprofen (ADVIL,MOTRIN) 200 MG tablet, Take 200 mg by mouth Every 6 (Six) Hours As Needed for Mild Pain ., Disp: , Rfl:   •  vitamin D (ERGOCALCIFEROL) 1.25 MG (34288 UT) capsule capsule, Take 1 capsule by mouth 1 (One) Time Per Week., Disp: 4 capsule, Rfl: 3    Lab Results:   Office Visit on 12/22/2020   Component Date Value Ref Range Status   • Report Summary 12/22/2020 FINAL   Final    Comment: ====================================================================  [facility] DRUG " ANALYSIS,UR  ====================================================================  Test                             Result       Flag       Units  Drug Present and Declared for Prescription Verification    Duloxetine                     PRESENT      EXPECTED  Drug Present not Declared for Prescription Verification    Carboxy-THC                    >386         UNEXPECTED ng/mg creat     Carboxy-THC is a metabolite of tetrahydrocannabinol  (THC).     Source of THC is most commonly illicit, but THC is also present     in a scheduled prescription medication.  Drug Absent but Declared for Prescription Verification    Methylphenidate                Not Detected UNEXPECTED    Ibuprofen                      Not Detected UNEXPECTED     Ibuprofen, as indicated in the declared medication list, is not     always detected even when used as directed.  ===========================================================                           =========  Test                      Result    Flag   Units      Ref Range    Creatinine              259              mg/dL      >=20  ====================================================================  Declared Medications:   The flagging and interpretation on this report are based on the   following declared medications.  Unexpected results may arise from   inaccuracies in the declared medications.   **Note: The testing scope of this panel includes these medications:   Duloxetine (Cymbalta)   Methylphenidate (Ritalin)   **Note: The testing scope of this panel does not include small to   moderate amounts of these reported medications:   Ibuprofen (Advil)   **Note: The testing scope of this panel does not include following   reported medications:   Fexofenadine (Allegra)   Vitamin B12   Vitamin D2 (Ergocalciferol)  ====================================================================  For clinical consultation, please call [phone].  ============================================                            ========================     Office Visit on 11/13/2020   Component Date Value Ref Range Status   • TSH 11/13/2020 1.300  0.270 - 4.200 uIU/mL Final   • Free T4 11/13/2020 1.10  0.93 - 1.70 ng/dL Final    Results may be falsely increased if patient taking Biotin.   • WBC 11/13/2020 8.62  3.40 - 10.80 10*3/mm3 Final   • RBC 11/13/2020 4.91  3.77 - 5.28 10*6/mm3 Final   • Hemoglobin 11/13/2020 13.0  12.0 - 15.9 g/dL Final   • Hematocrit 11/13/2020 40.2  34.0 - 46.6 % Final   • MCV 11/13/2020 81.9  79.0 - 97.0 fL Final   • MCH 11/13/2020 26.5* 26.6 - 33.0 pg Final   • MCHC 11/13/2020 32.3  31.5 - 35.7 g/dL Final   • RDW 11/13/2020 13.4  12.3 - 15.4 % Final   • Platelets 11/13/2020 323  140 - 450 10*3/mm3 Final   • Glucose 11/13/2020 89  65 - 99 mg/dL Final   • BUN 11/13/2020 11  6 - 20 mg/dL Final   • Creatinine 11/13/2020 0.82  0.57 - 1.00 mg/dL Final   • eGFR Non  Am 11/13/2020 88  >60 mL/min/1.73 Final   • eGFR African Am 11/13/2020 107  >60 mL/min/1.73 Final   • BUN/Creatinine Ratio 11/13/2020 13.4  7.0 - 25.0 Final   • Sodium 11/13/2020 141  136 - 145 mmol/L Final   • Potassium 11/13/2020 4.6  3.5 - 5.2 mmol/L Final   • Chloride 11/13/2020 102  98 - 107 mmol/L Final   • Total CO2 11/13/2020 26.7  22.0 - 29.0 mmol/L Final   • Calcium 11/13/2020 9.6  8.6 - 10.5 mg/dL Final   • Total Protein 11/13/2020 6.6  6.0 - 8.5 g/dL Final   • Albumin 11/13/2020 4.50  3.50 - 5.20 g/dL Final   • Globulin 11/13/2020 2.1  gm/dL Final   • A/G Ratio 11/13/2020 2.1  g/dL Final   • Total Bilirubin 11/13/2020 0.2  0.0 - 1.2 mg/dL Final   • Alkaline Phosphatase 11/13/2020 65  39 - 117 U/L Final   • AST (SGOT) 11/13/2020 11  1 - 32 U/L Final   • ALT (SGPT) 11/13/2020 14  1 - 33 U/L Final   • 25 Hydroxy, Vitamin D 11/13/2020 22.3* 30.0 - 100.0 ng/ml Final    Comment: Results may be falsely increased if patient taking Biotin.  Reference Range for Total Vitamin D 25(OH)  Deficiency <20.0 ng/mL  Insufficiency 21-29  ng/mL  Sufficiency  ng/mL  Toxicity >100 ng/ml     • Vitamin B-12 11/13/2020 251  211 - 946 pg/mL Final    Results may be falsely increased if patient taking Biotin.   • Hep C Virus Ab 11/13/2020 0.1  0.0 - 0.9 s/co ratio Final    Comment:                                   Negative:     < 0.8                               Indeterminate: 0.8 - 0.9                                    Positive:     > 0.9   The CDC recommends that a positive HCV antibody result   be followed up with a HCV Nucleic Acid Amplification   test (313457).         Mental Status Exam:   Hygiene:   good  Cooperation:  Cooperative  Eye Contact:  Good  Psychomotor Behavior:  Appropriate  Mood:anxious  Affect:  Appropriate  Hopelessness: Denies  Speech:  Normal  Thought Process:  Goal directed  Thought Content:  Normal  Suicidal:  None  Homicidal:  None  Hallucinations:  None  Delusion:  None  Memory:  Intact  Orientation:  Person, Place, Time and Situation  Reliability:  good  Insight:  Good  Judgement:  Good  Impulse Control:  Good  Physical/Medical Issues:  No     PHQ-9 Depression Screening  Little interest or pleasure in doing things?     Feeling down, depressed, or hopeless?     Trouble falling or staying asleep, or sleeping too much?     Feeling tired or having little energy?     Poor appetite or overeating?     Feeling bad about yourself - or that you are a failure or have let yourself or your family down?     Trouble concentrating on things, such as reading the newspaper or watching television?     Moving or speaking so slowly that other people could have noticed? Or the opposite - being so fidgety or restless that you have been moving around a lot more than usual?     Thoughts that you would be better off dead, or of hurting yourself in some way?     PHQ-9 Total Score     If you checked off any problems, how difficult have these problems made it for you to do your work, take care of things at home, or get along with other people?           Assessment/Plan:  Diagnoses and all orders for this visit:    1. ADHD (attention deficit hyperactivity disorder), combined type (Primary)  -     lisdexamfetamine (Vyvanse) 30 MG capsule; Take 1 capsule by mouth Every Morning  Dispense: 30 capsule; Refill: 0    2. Anxiety      Continue Cymbalta.  Discontinue Ritalin.  Start Vyvanse 30 mg p.o. every morning.    A psychological evaluation was conducted in order to assess past and current level of functioning. Areas assessed included, but were not limited to: perception of social support, perception of ability to face and deal with challenges in life (positive functioning), anxiety symptoms, depressive symptoms, perspective on beliefs/belief system, coping skills for stress, intelligence level,  Therapeutic rapport was established. Interventions conducted today were geared towards incorporating medication management along with support for continued therapy. Education was also provided as to the med management with this provider and what to expect in subsequent sessions.    We discussed risks, benefits,goals and side effects of the above medication and the patient was agreeable with the plan.Patient was educated on the importance of compliance with treatment and follow-up appointments. Patient is aware to contact the [facility] with any worsening of symptoms. To call for questions or concerns and return early if necessary. Patent is agreeable to go to the Emergency Department or call 911 should they begin SI/HI.     Treatment Plan:   Discussed risks, benefits, and alternatives of medication. Encouraged healthy habits (eating, exercise and sleep). Call if any questions or problems arise. Medication reconciled. Controlled substance monitoring report reviewed. Provided psychoeducation.. Discussed coping strategies and current stressors. Set appropriate boundaries and limits for patient's well-being. Use distraction techniques to improve symptoms. Access support  networks.      Return in about 4 weeks (around 2/18/2021) for Follow Up 15 min.    Aviva Hallman, APRN

## 2021-01-21 NOTE — TELEPHONE ENCOUNTER
Patient saw Aviva Hallman and requested refills of medications pended.       Last office visit 12/14/2020  No future appointment scheduled.

## 2021-01-22 RX ORDER — ERGOCALCIFEROL 1.25 MG/1
50000 CAPSULE ORAL WEEKLY
Qty: 4 CAPSULE | Refills: 3 | Status: SHIPPED | OUTPATIENT
Start: 2021-01-22 | End: 2021-04-29

## 2021-01-26 ENCOUNTER — TELEPHONE (OUTPATIENT)
Dept: PHYSICAL THERAPY | Facility: CLINIC | Age: 21
End: 2021-01-26

## 2021-01-29 ENCOUNTER — TELEPHONE (OUTPATIENT)
Dept: INTERNAL MEDICINE | Facility: CLINIC | Age: 21
End: 2021-01-29

## 2021-01-29 DIAGNOSIS — M41.9 SCOLIOSIS OF THORACOLUMBAR SPINE, UNSPECIFIED SCOLIOSIS TYPE: Primary | ICD-10-CM

## 2021-01-29 NOTE — TELEPHONE ENCOUNTER
I just put the referral to Dr. Gupta in today so It may be a couple of days before his office receives it.

## 2021-01-29 NOTE — TELEPHONE ENCOUNTER
PATIENT WOULD LIKE A REFERRAL TO AN ORTHOPEDIC  SURGERY  DOCTOR (SPINE SPECIALIST ). THE PATIENT WOULD LIKE TO SEE KANG HANKINS [phone]. DOCTOR MENKES OFFICE INFORMED THE PATIENT THAT THEY DO NOT HAVE THE REFERRAL IN THE SYSTEM YET. PLEASE CALL PATIENT TO DISCUSS THE REFERRAL.    CALL [phone]   PATIENT WILL BE WORKING UNTIL 3:30 CALL AFTER 3:30 PATIENT STATES THAT THE OFFICE CAN ALSO SEND A MESSAGE VIA [employer] IF THEY ARE UNABLE  TO CALL

## 2021-02-04 ENCOUNTER — TREATMENT (OUTPATIENT)
Dept: PHYSICAL THERAPY | Facility: CLINIC | Age: 21
End: 2021-02-04

## 2021-02-04 DIAGNOSIS — R29.898 DECREASED GRIP STRENGTH OF RIGHT HAND: Primary | ICD-10-CM

## 2021-02-04 DIAGNOSIS — R27.8 DECREASED COORDINATION: ICD-10-CM

## 2021-02-04 PROCEDURE — 97530 THERAPEUTIC ACTIVITIES: CPT | Performed by: OCCUPATIONAL THERAPIST

## 2021-02-04 NOTE — PROGRESS NOTES
"Occupational Therapy Daily Progress Note  Visit: 3  Date of Initial Visit: Type: THERAPY  Noted: 2021      Patient: Hanny Carbone   : 2000  Diagnosis/ICD-10 Code:  Decreased  strength of right hand [R29.898]  Referring practitioner: PRATIK Bland  Date of Initial Visit: Type: THERAPY  Noted: 2021  Today's Date: 2021  Patient seen for 3 sessions      Subjective:   Patient reports: \"I feel like my hands are getting stronger. I do have a hard time falling back in class sometimes bc I can't keep up.\"  Pain: 0/10  Subjective Questionnaire: n/a  Clinical Progress: improved  Home Program Compliance: Yes  Treatment has included: therapeutic activity    Subjective   Objective     OT Neuro         OT Exercises     Row Name 21 1258             Precautions    Existing Precautions/Restrictions  no known precautions/restrictions  -ST         Exercise 1    Exercise Name 1  HW w/focus on each individual letter formation, improving legibility and sizing for improved symmetry   -ST         Exercise 2    Exercise Name 2  HW w/focus on HW endurance and maintaining legibility and maintaining legibility and symmetry   -ST        User Key  (r) = Recorded By, (t) = Taken By, (c) = Cosigned By    Initials Name Provider Type    Amberly Ruth OTR Occupational Therapist           Assessment & Plan     Assessment  Assessment details: Pt now able to maintain dynamic tripod grasp throughout entire session w/o changing hand/wrist position but continues to note significant fatigue. No longer c/o pain but soreness present in thumb. Pt feels pencil  is improving ability to keep steady grasp. Pt continues to have trouble with symmetry of letters with both size and shaping.    Plan  Plan details: Continue w/POC and goals as est.         Visit Diagnoses:    ICD-10-CM ICD-9-CM   1. Decreased  strength of right hand  R29.898 729.89   2. Decreased coordination  R27.8 781.3             Timed:  Manual " Therapy:    0     mins  23648;  Therapeutic Exercise:    0     mins  48736;     Neuromuscular Janessa:    0    mins  00473;    Therapeutic Activity:     39     mins  83047;     Self-Care/ADL     0     mins  97925;   Sensory Int. Tech      0     mins 57299;  Ultrasound:     0     mins  57721;    Electrical Stimulation:    0     mins  22385 ( );    Untimed:  Electrical Stimulation:    0     mins  23426 ( );    Timed Treatment:   39   mins   Total Treatment:     39   mins    OT Signature: Amberly Shepard MS, OTR/L, CDP  [place] License #: [id number]

## 2021-02-18 DIAGNOSIS — R63.0 DECREASE IN APPETITE: Primary | ICD-10-CM

## 2021-02-18 RX ORDER — CYPROHEPTADINE HYDROCHLORIDE 4 MG/1
4 TABLET ORAL 2 TIMES DAILY
Qty: 60 TABLET | Refills: 2 | Status: SHIPPED | OUTPATIENT
Start: 2021-02-18 | End: 2022-03-14

## 2021-02-20 DIAGNOSIS — F90.2 ADHD (ATTENTION DEFICIT HYPERACTIVITY DISORDER), COMBINED TYPE: ICD-10-CM

## 2021-02-25 ENCOUNTER — TREATMENT (OUTPATIENT)
Dept: PHYSICAL THERAPY | Facility: CLINIC | Age: 21
End: 2021-02-25

## 2021-02-25 ENCOUNTER — OFFICE VISIT (OUTPATIENT)
Dept: BEHAVIORAL HEALTH | Facility: CLINIC | Age: 21
End: 2021-02-25

## 2021-02-25 VITALS
WEIGHT: 237.2 LBS | TEMPERATURE: 97.7 F | DIASTOLIC BLOOD PRESSURE: 68 MMHG | HEART RATE: 95 BPM | OXYGEN SATURATION: 98 % | SYSTOLIC BLOOD PRESSURE: 118 MMHG | HEIGHT: 65 IN | BODY MASS INDEX: 39.52 KG/M2

## 2021-02-25 DIAGNOSIS — R27.8 DECREASED COORDINATION: ICD-10-CM

## 2021-02-25 DIAGNOSIS — F41.9 ANXIETY: Primary | ICD-10-CM

## 2021-02-25 DIAGNOSIS — R29.898 DECREASED GRIP STRENGTH OF RIGHT HAND: Primary | ICD-10-CM

## 2021-02-25 DIAGNOSIS — F90.2 ADHD (ATTENTION DEFICIT HYPERACTIVITY DISORDER), COMBINED TYPE: ICD-10-CM

## 2021-02-25 PROCEDURE — 97530 THERAPEUTIC ACTIVITIES: CPT | Performed by: OCCUPATIONAL THERAPIST

## 2021-02-25 PROCEDURE — 97110 THERAPEUTIC EXERCISES: CPT | Performed by: OCCUPATIONAL THERAPIST

## 2021-02-25 PROCEDURE — 99213 OFFICE O/P EST LOW 20 MIN: CPT | Performed by: NURSE PRACTITIONER

## 2021-02-25 RX ORDER — DULOXETIN HYDROCHLORIDE 60 MG/1
60 CAPSULE, DELAYED RELEASE ORAL DAILY
Qty: 30 CAPSULE | Refills: 2 | Status: SHIPPED | OUTPATIENT
Start: 2021-02-25 | End: 2021-04-29 | Stop reason: SDUPTHER

## 2021-02-25 NOTE — PROGRESS NOTES
Patient Name: Hanny Carbone  MRN: [id number]   :  2000     Chief Complaint:      ICD-10-CM ICD-9-CM   1. Anxiety  F41.9 300.00   2. ADHD (attention deficit hyperactivity disorder), combined type  F90.2 314.01       History of Present Illness: Hanny Carbone is a 20 y.o. female is here today for medication management follow up. Pt states she is picking at skin more and notes 3 large scabs on her forehead. States sometimes she doesn't even realize she is doing it.    The following portions of the patient's history were reviewed and updated as appropriate: allergies, current medications, past family history, past medical history, past social history, past surgical history and problem list.    Review of Systems;;  Review of Systems   Constitutional: Negative for activity change, appetite change, fatigue, unexpected weight gain and unexpected weight loss.   Respiratory: Negative for shortness of breath and wheezing.    Gastrointestinal: Negative for constipation, diarrhea, nausea and vomiting.   Musculoskeletal: Negative for gait problem.   Skin: Negative for dry skin and rash.   Neurological: Negative for dizziness, speech difficulty, weakness, light-headedness, headache, memory problem and confusion.   Psychiatric/Behavioral: Positive for decreased concentration and stress. Negative for agitation, behavioral problems, dysphoric mood, hallucinations, self-injury, sleep disturbance, suicidal ideas, negative for hyperactivity and depressed mood. The patient is nervous/anxious.        Physical Exam;;  Physical Exam  Vitals signs and nursing note reviewed.   Constitutional:       General: She is not in acute distress.     Appearance: She is well-developed. She is not diaphoretic.   HENT:      Head: Normocephalic and atraumatic.   Eyes:      Conjunctiva/sclera: Conjunctivae normal.   Neck:      Musculoskeletal: Full passive range of motion without pain and normal range of motion.   Cardiovascular:      Rate and Rhythm:  "Normal rate.   Pulmonary:      Effort: Pulmonary effort is normal. No respiratory distress.   Musculoskeletal: Normal range of motion.   Skin:     General: Skin is warm and dry.   Neurological:      Mental Status: She is alert and oriented to person, place, and time.   Psychiatric:         Mood and Affect: Mood is anxious. Mood is not depressed. Affect is not labile, blunt, angry or inappropriate.         Speech: Speech is not rapid and pressured or tangential.         Behavior: Behavior normal. Behavior is not agitated, slowed, aggressive, withdrawn, hyperactive or combative. Behavior is cooperative.         Thought Content: Thought content normal. Thought content is not paranoid or delusional. Thought content does not include homicidal or suicidal ideation. Thought content does not include homicidal or suicidal plan.         Judgment: Judgment normal.       Blood pressure 118/68, pulse 95, temperature 97.7 °F (36.5 °C), height 165.1 cm (65\"), weight 108 kg (237 lb 3.2 oz), SpO2 98 %.  Body mass index is 39.47 kg/m².    Current Medications;;    Current Outpatient Medications:   •  cyanocobalamin ([facility] Vitamin B-12) 1000 MCG tablet, Take 1 tablet by mouth Daily., Disp: 30 tablet, Rfl: 11  •  cyproheptadine (PERIACTIN) 4 MG tablet, Take 1 tablet by mouth 2 (Two) Times a Day., Disp: 60 tablet, Rfl: 2  •  fexofenadine (ALLEGRA) 60 MG tablet, Take 60 mg by mouth Daily., Disp: , Rfl:   •  ibuprofen (ADVIL,MOTRIN) 200 MG tablet, Take 200 mg by mouth Every 6 (Six) Hours As Needed for Mild Pain ., Disp: , Rfl:   •  vitamin D (ERGOCALCIFEROL) 1.25 MG (67760 UT) capsule capsule, Take 1 capsule by mouth 1 (One) Time Per Week., Disp: 4 capsule, Rfl: 3    Lab Results:   Office Visit on 12/22/2020   Component Date Value Ref Range Status   • Report Summary 12/22/2020 FINAL   Final    Comment: ====================================================================  [facility] DRUG " ANALYSIS,UR  ====================================================================  Test                             Result       Flag       Units  Drug Present and Declared for Prescription Verification    Duloxetine                     PRESENT      EXPECTED  Drug Present not Declared for Prescription Verification    Carboxy-THC                    >386         UNEXPECTED ng/mg creat     Carboxy-THC is a metabolite of tetrahydrocannabinol  (THC).     Source of THC is most commonly illicit, but THC is also present     in a scheduled prescription medication.  Drug Absent but Declared for Prescription Verification    Methylphenidate                Not Detected UNEXPECTED    Ibuprofen                      Not Detected UNEXPECTED     Ibuprofen, as indicated in the declared medication list, is not     always detected even when used as directed.  ===========================================================                           =========  Test                      Result    Flag   Units      Ref Range    Creatinine              259              mg/dL      >=20  ====================================================================  Declared Medications:   The flagging and interpretation on this report are based on the   following declared medications.  Unexpected results may arise from   inaccuracies in the declared medications.   **Note: The testing scope of this panel includes these medications:   Duloxetine (Cymbalta)   Methylphenidate (Ritalin)   **Note: The testing scope of this panel does not include small to   moderate amounts of these reported medications:   Ibuprofen (Advil)   **Note: The testing scope of this panel does not include following   reported medications:   Fexofenadine (Allegra)   Vitamin B12   Vitamin D2 (Ergocalciferol)  ====================================================================  For clinical consultation, please call [phone].  ============================================                            ========================         Mental Status Exam:   Hygiene:   good  Cooperation:  Cooperative  Eye Contact:  Good  Psychomotor Behavior:  Appropriate  Mood:anxious  Affect:  Appropriate  Hopelessness: Denies  Speech:  Normal  Thought Process:  Goal directed  Thought Content:  Normal  Suicidal:  None  Homicidal:  None  Hallucinations:  None  Delusion:  None  Memory:  Intact  Orientation:  Person, Place and Time  Reliability:  good  Insight:  Good  Judgement:  Good  Impulse Control:  Good  Physical/Medical Issues:  No     PHQ-9 Depression Screening  Little interest or pleasure in doing things? 3   Feeling down, depressed, or hopeless? 2   Trouble falling or staying asleep, or sleeping too much? 2   Feeling tired or having little energy? 2   Poor appetite or overeating? 3   Feeling bad about yourself - or that you are a failure or have let yourself or your family down? 1   Trouble concentrating on things, such as reading the newspaper or watching television? 2   Moving or speaking so slowly that other people could have noticed? Or the opposite - being so fidgety or restless that you have been moving around a lot more than usual? 1   Thoughts that you would be better off dead, or of hurting yourself in some way? 0   PHQ-9 Total Score 16   If you checked off any problems, how difficult have these problems made it for you to do your work, take care of things at home, or get along with other people?          Assessment/Plan:  Diagnoses and all orders for this visit:    1. Anxiety (Primary)  -     DULoxetine (Cymbalta) 60 MG capsule; Take 1 capsule by mouth Daily.  Dispense: 30 capsule; Refill: 2    2. ADHD (attention deficit hyperactivity disorder), combined type  -     lisdexamfetamine (Vyvanse) 50 MG capsule; Take 1 capsule by mouth Every Morning  Dispense: 30 capsule; Refill: 0      Increase  Vyvanse and Cymbalta.    A psychological evaluation was conducted in order to assess past and current level of  functioning. Areas assessed included, but were not limited to: perception of social support, perception of ability to face and deal with challenges in life (positive functioning), anxiety symptoms, depressive symptoms, perspective on beliefs/belief system, coping skills for stress, intelligence level,  Therapeutic rapport was established. Interventions conducted today were geared towards incorporating medication management along with support for continued therapy. Education was also provided as to the med management with this provider and what to expect in subsequent sessions.    We discussed risks, benefits,goals and side effects of the above medication and the patient was agreeable with the plan.Patient was educated on the importance of compliance with treatment and follow-up appointments. Patient is aware to contact the [facility] with any worsening of symptoms. To call for questions or concerns and return early if necessary. Patent is agreeable to go to the Emergency Department or call 911 should they begin SI/HI.     Treatment Plan:   Discussed risks, benefits, and alternatives of medication. Encouraged healthy habits (eating, exercise and sleep). Call if any questions or problems arise. Medication reconciled. Controlled substance monitoring report reviewed. Provided psychoeducation.. Discussed coping strategies and current stressors. Set appropriate boundaries and limits for patient's well-being. Use distraction techniques to improve symptoms. Access support networks.      Return in about 4 weeks (around 3/25/2021) for Follow Up 15 min.    SEBLE Agustin

## 2021-02-25 NOTE — PROGRESS NOTES
"OT Re-Assessment / Re-Certification        Patient: Hanny Carbone   : 2000  Diagnosis/ICD-10 Code:  Decreased  strength of right hand [R29.898]  Referring practitioner: PRATIK Bland  Date of Initial Visit: Type: THERAPY  Noted: 2021  Today's Date: 2021  Patient seen for 4 sessions      Subjective:   Patient reports: \"I think it's going better with my HW and overall stuff but I can tell I continue to use my forearm more than I should.\"  Pain: 0/10  Subjective Questionnaire: 9HPT R: 16.08 L: 16.10  Improved speed and accuracy with R, dominant hand  Clinical Progress: improved  Home Program Compliance: Yes  Treatment has included: therapeutic exercise and therapeutic activity    Subjective   Objective     OT Neuro         Assessment & Plan     Assessment  Impairments: abnormal coordination and lacks appropriate home exercise program  Assessment details: OT re-assessment completed per POC. Pt demonstrates increased speed and accuracy with R hand FMC with 9HPT and slightly increased R  strength. Pt continues to however fatigue quickly with sustained writing. Pt exhibits significant hypermobility of all joints, especially with wrist flexion. Pt demonstrates profound weakness in wrist with extension but requires additional cuing to avoid ligament movement out of place. Recommend continued skilled OT services to address these areas and promote increased satisfaction, independence and ease with hand use.   Prognosis: good  Functional Limitations: unable to perform repetitive tasks  Goals  Plan Goals:   Pt will increase R/L  strength by 8 # by 8 wks to demonstrate improved strength and function for daily tasks.     Pt will increase lateral  strength B hands by 5 # by 8 wks to demonstrate improved strength and function for daily tasks.     Pt will be independent with hand strengthening HEP to increase independence with daily tasks by 4 wks.             Plan  Planned therapy " interventions: motor coordination training, neuromuscular re-education, strengthening, therapeutic activities, IADL retraining, home exercise program, fine motor coordination training and ADL retraining  Treatment plan discussed with: patient  Plan details: Est OT POC and goals to reflect above needs and promote increased strength, coordination and IADL performance.       OT Exercises     Row Name 02/25/21 1300             Precautions    Existing Precautions/Restrictions  no known precautions/restrictions  -ST         Exercise 1    Exercise Name 1  OT re-assessment completed per POC   -ST         Exercise 2    Exercise Name 2  pronation/supination, flexi bar  -ST      Resistance 2  Red  -ST      Sets 2  1  -ST      Reps 2  10  -ST         Exercise 3    Exercise Name 3  pronation/supination, loaded end strengthening w/short tbar  -ST      Equipment 3  Dowel  -ST      Weights/Plates 3  2  -ST      Sets 3  1  -ST      Reps 3  10  -ST         Exercise 4    Exercise Name 4  wrist extension, several modifications made to avoid ligament hypermobility   -ST      Equipment 4  [place]  -ST      Weights/Plates 4  1  -ST      Sets 4  1  -ST      Reps 4  10  -ST         Exercise 5    Exercise Name 5  towel wringing exercise  -ST      Reps 5  5  -ST         Exercise 6    Exercise Name 6  hand strengthening  -ST      Equipment 6  Hand Gripper  -ST      Resistance 6  Red  -ST      Sets 6  2  -ST      Reps 6  10  -ST         Exercise 7    Exercise Name 7  HW; focus on legibility by improving spacing and symmetry of letters and words   -ST        User Key  (r) = Recorded By, (t) = Taken By, (c) = Cosigned By    Initials Name Provider Type    Amberly Ruth, OTR Occupational Therapist        Visit Diagnoses:    ICD-10-CM ICD-9-CM   1. Decreased  strength of right hand  R29.898 729.89   2. Decreased coordination  R27.8 781.3       Progress toward previous goals: Partially Met      Recommendations: Continue as  planned  Timeframe: 6 weeks  Prognosis to achieve goals: good    OT Signature: Amberly Shepard, MS, OTR/L, CDP  [place] License #: [id number]    Based upon review of the patient's progress and continued therapy plan, it is my medical opinion that Hanny Carbone should continue occupational therapy treatment at [facility]  [address] [place], [place] [address] [address] [phone].    Signature: __________________________________  Alexsandra Abreu PA    Timed:  Manual Therapy:    0     mins  10837;  Therapeutic Exercise:    30     mins  09968;     Neuromuscular Janessa:    0    mins  00788;    Therapeutic Activity:     14     mins  18738;     Self-Care/ADL     0     mins  41716;   Sensory Int. Tech      0     mins 97961;  Ultrasound:     0     mins  38853;    Electrical Stimulation:    0     mins  92497 ( );    Untimed:  Electrical Stimulation:    0     mins  31900 ( );    Timed Treatment:   44   mins   Total Treatment:     44   mins

## 2021-03-11 ENCOUNTER — TREATMENT (OUTPATIENT)
Dept: PHYSICAL THERAPY | Facility: CLINIC | Age: 21
End: 2021-03-11

## 2021-03-11 DIAGNOSIS — R27.8 DECREASED COORDINATION: ICD-10-CM

## 2021-03-11 DIAGNOSIS — R29.898 DECREASED GRIP STRENGTH OF RIGHT HAND: Primary | ICD-10-CM

## 2021-03-11 PROCEDURE — 97530 THERAPEUTIC ACTIVITIES: CPT | Performed by: OCCUPATIONAL THERAPIST

## 2021-03-11 NOTE — PROGRESS NOTES
"Occupational Therapy Daily Progress Note  Visit: 5  Date of Initial Visit: Type: THERAPY  Noted: 2021      Patient: Hanny Carbone   : 2000  Diagnosis/ICD-10 Code:  Decreased  strength of right hand [R29.898]  Referring practitioner: PRATIK Bland  Date of Initial Visit: Type: THERAPY  Noted: 2021  Today's Date: 3/11/2021  Patient seen for 5 sessions      Subjective:   Patient reports: \"I can't believe how good my HW looks!!\"  Pain: 0/10  Subjective Questionnaire: n/a  Clinical Progress: improved  Home Program Compliance: Yes  Treatment has included: therapeutic activity    Subjective   Objective     OT Neuro         OT Exercises     Row Name 21 1300             Precautions    Existing Precautions/Restrictions  no known precautions/restrictions  -ST         Exercise 1    Exercise Name 1  HW; pt demonstrates excellent use of dynamic tripod grasp and wrist positioning while exhibiting good legibility, spacing and formation of letters  -ST         Exercise 2    Exercise Name 2  review of previous HEPs  -ST        User Key  (r) = Recorded By, (t) = Taken By, (c) = Cosigned By    Initials Name Provider Type    Amberly Ruth OTR Occupational Therapist           Assessment & Plan     Assessment  Impairments: abnormal coordination and lacks appropriate home exercise program  Assessment details: Pt has met all goals and achieved max potential with skilled OT services at this time. She demonstrates excellent carry over with HW techniques, hand and wrist postioning, modifications and exercises for strengthening. Pt now able to complete all class work with legible and satisfactory HW.    Prognosis: good  Functional Limitations: unable to perform repetitive tasks  Goals  Plan Goals:   Pt will increase R/L  strength by 8 # by 8 wks to demonstrate improved strength and function for daily tasks. MET    Pt will increase lateral  strength B hands by 5 # by 8 wks to demonstrate " improved strength and function for daily tasks. MET    Pt will be independent with hand strengthening HEP to increase independence with daily tasks by 4 wks. MET            Plan  Planned therapy interventions: motor coordination training, neuromuscular re-education, strengthening, therapeutic activities, IADL retraining, home exercise program, fine motor coordination training and ADL retraining  Treatment plan discussed with: patient  Plan details: D/C skilled OT; pt has met all goals and achieved max potential.         Visit Diagnoses:    ICD-10-CM ICD-9-CM   1. Decreased  strength of right hand  R29.898 729.89   2. Decreased coordination  R27.8 781.3             Timed:  Manual Therapy:    0     mins  72946;  Therapeutic Exercise:    0     mins  54852;     Neuromuscular Janessa:    0    mins  77355;    Therapeutic Activity:     40     mins  46870;     Self-Care/ADL     0     mins  34628;   Sensory Int. Tech      0     mins 34648;  Ultrasound:     0     mins  95779;    Electrical Stimulation:    0     mins  81318 ( );    Untimed:  Electrical Stimulation:    0     mins  67167 ( );    Timed Treatment:   40   mins   Total Treatment:     40   mins    OT Signature: Amberly Shepard MS, OTR/L, CDP  [place] License #: [id number]

## 2021-03-20 DIAGNOSIS — F41.9 ANXIETY: ICD-10-CM

## 2021-03-22 ENCOUNTER — IMMUNIZATION (OUTPATIENT)
Dept: VACCINE CLINIC | Facility: HOSPITAL | Age: 21
End: 2021-03-22

## 2021-03-22 PROCEDURE — 91300 HC SARSCOV02 VAC 30MCG/0.3ML IM: CPT | Performed by: INTERNAL MEDICINE

## 2021-03-22 PROCEDURE — 0001A: CPT | Performed by: INTERNAL MEDICINE

## 2021-03-22 RX ORDER — DULOXETIN HYDROCHLORIDE 30 MG/1
CAPSULE, DELAYED RELEASE ORAL
Qty: 30 CAPSULE | Refills: 2 | OUTPATIENT
Start: 2021-03-22

## 2021-03-31 DIAGNOSIS — F90.2 ADHD (ATTENTION DEFICIT HYPERACTIVITY DISORDER), COMBINED TYPE: ICD-10-CM

## 2021-03-31 NOTE — TELEPHONE ENCOUNTER
Patient states she missed her appointment yesterday due to studying and doing home work.  She did reschedule her appointment but it isn't until 4/15 she was wondering if she could get a refill of her vyvanse.  Phone number verified pharmacy verified.  Please advise.

## 2021-04-12 ENCOUNTER — IMMUNIZATION (OUTPATIENT)
Dept: VACCINE CLINIC | Facility: HOSPITAL | Age: 21
End: 2021-04-12

## 2021-04-12 PROCEDURE — 0002A: CPT | Performed by: INTERNAL MEDICINE

## 2021-04-12 PROCEDURE — 91300 HC SARSCOV02 VAC 30MCG/0.3ML IM: CPT | Performed by: INTERNAL MEDICINE

## 2021-04-19 DIAGNOSIS — L70.0 ACNE VULGARIS: Primary | ICD-10-CM

## 2021-04-29 ENCOUNTER — OFFICE VISIT (OUTPATIENT)
Dept: BEHAVIORAL HEALTH | Facility: CLINIC | Age: 21
End: 2021-04-29

## 2021-04-29 VITALS
DIASTOLIC BLOOD PRESSURE: 68 MMHG | SYSTOLIC BLOOD PRESSURE: 116 MMHG | WEIGHT: 226.8 LBS | BODY MASS INDEX: 37.79 KG/M2 | HEIGHT: 65 IN

## 2021-04-29 DIAGNOSIS — F41.9 ANXIETY: Primary | ICD-10-CM

## 2021-04-29 DIAGNOSIS — F90.2 ADHD (ATTENTION DEFICIT HYPERACTIVITY DISORDER), COMBINED TYPE: ICD-10-CM

## 2021-04-29 PROCEDURE — 99213 OFFICE O/P EST LOW 20 MIN: CPT | Performed by: NURSE PRACTITIONER

## 2021-04-29 RX ORDER — METHYLPHENIDATE HYDROCHLORIDE 30 MG/1
30 CAPSULE, EXTENDED RELEASE ORAL EVERY MORNING
Qty: 30 CAPSULE | Refills: 0 | Status: SHIPPED | OUTPATIENT
Start: 2021-04-29 | End: 2021-06-01 | Stop reason: SDUPTHER

## 2021-04-29 RX ORDER — DULOXETIN HYDROCHLORIDE 60 MG/1
60 CAPSULE, DELAYED RELEASE ORAL DAILY
Qty: 30 CAPSULE | Refills: 2 | Status: SHIPPED | OUTPATIENT
Start: 2021-04-29 | End: 2021-06-01 | Stop reason: SDUPTHER

## 2021-04-29 NOTE — PROGRESS NOTES
Patient Name: Hanny Carbone  MRN: [id number]   :  2000     Chief Complaint:      ICD-10-CM ICD-9-CM   1. Anxiety  F41.9 300.00   2. ADHD (attention deficit hyperactivity disorder), combined type  F90.2 314.01       History of Present Illness: Hanny Carbone is a 20 y.o. female is here today for medication management follow up.  Patient states she thinks Vyvanse is giving her negative effects.  States her appetite continues to be poor.  States she never had to set an alarm to wake up and now she has set multiple alarms and have someone call her to wake her up.  States she slept so much that she has missed tests and assignments.  Patient also noticed cold numbness in her hands and feet.  Would like to try something else.  Also wonders about increasing Cymbalta.  [place]    The following portions of the patient's history were reviewed and updated as appropriate: allergies, current medications, past family history, past medical history, past social history, past surgical history and problem list.    Review of Systems;;  Review of Systems   Constitutional: Negative for activity change, appetite change, fatigue, unexpected weight gain and unexpected weight loss.   Respiratory: Negative for shortness of breath and wheezing.    Gastrointestinal: Negative for constipation, diarrhea, nausea and vomiting.   Musculoskeletal: Negative for gait problem.   Skin: Negative for dry skin and rash.   Neurological: Negative for dizziness, speech difficulty, weakness, light-headedness, headache, memory problem and confusion.   Psychiatric/Behavioral: Positive for decreased concentration, dysphoric mood, sleep disturbance, positive for hyperactivity, depressed mood and stress. Negative for agitation, behavioral problems, hallucinations, self-injury and suicidal ideas. The patient is nervous/anxious.        Physical Exam;;  Physical Exam  Vitals and nursing note reviewed.  "  Constitutional:       General: She is not in acute distress.     Appearance: She is well-developed. She is not diaphoretic.   HENT:      Head: Normocephalic and atraumatic.   Eyes:      Conjunctiva/sclera: Conjunctivae normal.   Cardiovascular:      Rate and Rhythm: Normal rate.   Pulmonary:      Effort: Pulmonary effort is normal. No respiratory distress.   Musculoskeletal:         General: Normal range of motion.      Cervical back: Full passive range of motion without pain and normal range of motion.   Skin:     General: Skin is warm and dry.   Neurological:      Mental Status: She is alert and oriented to person, place, and time.   Psychiatric:         Mood and Affect: Mood is anxious and depressed. Affect is not labile, blunt, angry or inappropriate.         Speech: Speech is not rapid and pressured or tangential.         Behavior: Behavior normal. Behavior is not agitated, slowed, aggressive, withdrawn, hyperactive or combative. Behavior is cooperative.         Thought Content: Thought content normal. Thought content is not paranoid or delusional. Thought content does not include homicidal or suicidal ideation. Thought content does not include homicidal or suicidal plan.         Judgment: Judgment normal.       Blood pressure 116/68, height 165.1 cm (65\"), weight 103 kg (226 lb 12.8 oz).  Body mass index is 37.74 kg/m².    Current Medications;;    Current Outpatient Medications:   •  cyproheptadine (PERIACTIN) 4 MG tablet, Take 1 tablet by mouth 2 (Two) Times a Day., Disp: 60 tablet, Rfl: 2  •  DULoxetine (Cymbalta) 60 MG capsule, Take 1 capsule by mouth Daily., Disp: 30 capsule, Rfl: 2  •  fexofenadine (ALLEGRA) 60 MG tablet, Take 60 mg by mouth Daily., Disp: , Rfl:   •  ibuprofen (ADVIL,MOTRIN) 200 MG tablet, Take 200 mg by mouth Every 6 (Six) Hours As Needed for Mild Pain ., Disp: , Rfl:   •  methylphenidate LA (Ritalin LA) 30 MG 24 hr capsule, Take 1 capsule by mouth Every Morning, Disp: 30 capsule, Rfl: " 0    Lab Results:   No visits with results within 3 Month(s) from this visit.   Latest known visit with results is:   Office Visit on 12/22/2020   Component Date Value Ref Range Status   • Report Summary 12/22/2020 FINAL   Final    Comment: ====================================================================  [facility] COMP DRUG ANALYSIS,[facility]  ====================================================================  Test                             Result       Flag       Units  Drug Present and Declared for Prescription Verification    Duloxetine                     PRESENT      EXPECTED  Drug Present not Declared for Prescription Verification    Carboxy-THC                    >386         UNEXPECTED ng/mg creat     Carboxy-THC is a metabolite of tetrahydrocannabinol  (THC).     Source of THC is most commonly illicit, but THC is also present     in a scheduled prescription medication.  Drug Absent but Declared for Prescription Verification    Methylphenidate                Not Detected UNEXPECTED    Ibuprofen                      Not Detected UNEXPECTED     Ibuprofen, as indicated in the declared medication list, is not     always detected even when used as directed.  ===========================================================                           =========  Test                      Result    Flag   Units      Ref Range    Creatinine              259              mg/dL      >=20  ====================================================================  Declared Medications:   The flagging and interpretation on this report are based on the   following declared medications.  Unexpected results may arise from   inaccuracies in the declared medications.   **Note: The testing scope of this panel includes these medications:   Duloxetine (Cymbalta)   Methylphenidate (Ritalin)   **Note: The testing scope of this panel does not include small to   moderate amounts of these reported medications:   Ibuprofen (Advil)   **Note: The  testing scope of this panel does not include following   reported medications:   Fexofenadine (Allegra)   Vitamin B12   Vitamin D2 (Ergocalciferol)  ====================================================================  For clinical consultation, please call [phone].  ============================================                           ========================         Mental Status Exam:   Hygiene:   fair  Cooperation:  Cooperative  Eye Contact:  Good  Psychomotor Behavior:  Restless  Mood:anxious and depressed  Affect:  Appropriate  Hopelessness: Denies  Speech:  Rapid  Thought Process:  Goal directed  Thought Content:  Normal  Suicidal:  None  Homicidal:  None  Hallucinations:  None  Delusion:  None  Memory:  Intact  Orientation:  Person, Place, Time and Situation  Reliability:  good  Insight:  Good  Judgement:  Good  Impulse Control:  Good  Physical/Medical Issues:  No     PHQ-9 Depression Screening  Little interest or pleasure in doing things? 2   Feeling down, depressed, or hopeless? 1   Trouble falling or staying asleep, or sleeping too much? 3   Feeling tired or having little energy? 3   Poor appetite or overeating? 3   Feeling bad about yourself - or that you are a failure or have let yourself or your family down? 1   Trouble concentrating on things, such as reading the newspaper or watching television? 1   Moving or speaking so slowly that other people could have noticed? Or the opposite - being so fidgety or restless that you have been moving around a lot more than usual? 0   Thoughts that you would be better off dead, or of hurting yourself in some way? 0   PHQ-9 Total Score 14   If you checked off any problems, how difficult have these problems made it for you to do your work, take care of things at home, or get along with other people?          Assessment/Plan:  Diagnoses and all orders for this visit:    1. Anxiety (Primary)  -     DULoxetine (Cymbalta) 60 MG capsule; Take 1 capsule by mouth  Daily.  Dispense: 30 capsule; Refill: 2    2. ADHD (attention deficit hyperactivity disorder), combined type  -     methylphenidate LA (Ritalin LA) 30 MG 24 hr capsule; Take 1 capsule by mouth Every Morning  Dispense: 30 capsule; Refill: 0      Discontinued by this.  Start Ritalin LA 30 mg p.o. daily.  Will reassess need to increase Cymbalta at next appointment.    A psychological evaluation was conducted in order to assess past and current level of functioning. Areas assessed included, but were not limited to: perception of social support, perception of ability to face and deal with challenges in life (positive functioning), anxiety symptoms, depressive symptoms, perspective on beliefs/belief system, coping skills for stress, intelligence level,  Therapeutic rapport was established. Interventions conducted today were geared towards incorporating medication management along with support for continued therapy. Education was also provided as to the med management with this provider and what to expect in subsequent sessions.    We discussed risks, benefits,goals and side effects of the above medication and the patient was agreeable with the plan.Patient was educated on the importance of compliance with treatment and follow-up appointments. Patient is aware to contact the [facility] with any worsening of symptoms. To call for questions or concerns and return early if necessary. Patent is agreeable to go to the Emergency Department or call 911 should they begin SI/HI.     Treatment Plan:   Discussed risks, benefits, and alternatives of medication. Encouraged healthy habits (eating, exercise and sleep). Call if any questions or problems arise. Medication reconciled. Controlled substance monitoring report reviewed. Provided psychoeducation.. Discussed coping strategies and current stressors. Set appropriate boundaries and limits for patient's well-being. Use distraction techniques to improve symptoms. Access support  networks.      Return in about 4 weeks (around 5/27/2021) for Follow Up 15 min.    Aviva Hallman, APRN

## 2021-06-01 ENCOUNTER — OFFICE VISIT (OUTPATIENT)
Dept: BEHAVIORAL HEALTH | Facility: CLINIC | Age: 21
End: 2021-06-01

## 2021-06-01 VITALS
BODY MASS INDEX: 37.25 KG/M2 | DIASTOLIC BLOOD PRESSURE: 72 MMHG | HEIGHT: 65 IN | SYSTOLIC BLOOD PRESSURE: 116 MMHG | WEIGHT: 223.6 LBS

## 2021-06-01 DIAGNOSIS — F41.9 ANXIETY: ICD-10-CM

## 2021-06-01 DIAGNOSIS — R63.0 DECREASE IN APPETITE: ICD-10-CM

## 2021-06-01 DIAGNOSIS — F90.2 ADHD (ATTENTION DEFICIT HYPERACTIVITY DISORDER), COMBINED TYPE: Primary | ICD-10-CM

## 2021-06-01 DIAGNOSIS — F33.1 MODERATE EPISODE OF RECURRENT MAJOR DEPRESSIVE DISORDER (HCC): ICD-10-CM

## 2021-06-01 PROCEDURE — 99213 OFFICE O/P EST LOW 20 MIN: CPT | Performed by: NURSE PRACTITIONER

## 2021-06-01 RX ORDER — DULOXETIN HYDROCHLORIDE 60 MG/1
60 CAPSULE, DELAYED RELEASE ORAL DAILY
Qty: 30 CAPSULE | Refills: 2 | Status: SHIPPED | OUTPATIENT
Start: 2021-06-01 | End: 2021-06-08

## 2021-06-01 RX ORDER — DAPSONE 100 MG/1
TABLET ORAL
COMMUNITY
Start: 2021-05-07 | End: 2021-09-07

## 2021-06-01 RX ORDER — METHYLPHENIDATE HYDROCHLORIDE 30 MG/1
30 CAPSULE, EXTENDED RELEASE ORAL EVERY MORNING
Qty: 30 CAPSULE | Refills: 0 | Status: SHIPPED | OUTPATIENT
Start: 2021-06-01 | End: 2021-07-08 | Stop reason: SDUPTHER

## 2021-06-01 RX ORDER — CLINDAMYCIN PHOSPHATE 10 UG/ML
LOTION TOPICAL
COMMUNITY
Start: 2021-05-07 | End: 2021-09-07

## 2021-06-01 RX ORDER — BENZOYL PEROXIDE 10 G/100G
GEL TOPICAL
COMMUNITY
Start: 2021-05-07 | End: 2022-03-14

## 2021-06-01 RX ORDER — ARIPIPRAZOLE 5 MG/1
5 TABLET ORAL DAILY
Qty: 30 TABLET | Refills: 1 | Status: SHIPPED | OUTPATIENT
Start: 2021-06-01 | End: 2021-07-08

## 2021-06-01 RX ORDER — ANTISEPTIC SURGICAL SCRUB 0.04 MG/ML
SOLUTION TOPICAL
COMMUNITY
Start: 2021-05-07 | End: 2021-10-19

## 2021-06-01 RX ORDER — MINOCYCLINE HYDROCHLORIDE 100 MG/1
CAPSULE ORAL
COMMUNITY
Start: 2021-05-07 | End: 2021-09-07
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
## 2021-06-03 ENCOUNTER — PRIOR AUTHORIZATION (OUTPATIENT)
Dept: BEHAVIORAL HEALTH | Facility: CLINIC | Age: 21
End: 2021-06-03

## 2021-06-06 ENCOUNTER — PATIENT MESSAGE (OUTPATIENT)
Dept: BEHAVIORAL HEALTH | Facility: CLINIC | Age: 21
End: 2021-06-06

## 2021-06-08 DIAGNOSIS — F33.1 MODERATE EPISODE OF RECURRENT MAJOR DEPRESSIVE DISORDER (HCC): ICD-10-CM

## 2021-06-08 DIAGNOSIS — F41.9 ANXIETY: Primary | ICD-10-CM

## 2021-06-08 RX ORDER — DULOXETIN HYDROCHLORIDE 30 MG/1
30 CAPSULE, DELAYED RELEASE ORAL DAILY
Qty: 30 CAPSULE | Refills: 2 | Status: SHIPPED | OUTPATIENT
Start: 2021-06-08 | End: 2021-07-08 | Stop reason: SDUPTHER
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
## 2021-07-08 ENCOUNTER — OFFICE VISIT (OUTPATIENT)
Dept: BEHAVIORAL HEALTH | Facility: CLINIC | Age: 21
End: 2021-07-08

## 2021-07-08 VITALS
BODY MASS INDEX: 36.62 KG/M2 | WEIGHT: 219.8 LBS | SYSTOLIC BLOOD PRESSURE: 122 MMHG | DIASTOLIC BLOOD PRESSURE: 72 MMHG | HEIGHT: 65 IN

## 2021-07-08 DIAGNOSIS — F90.2 ADHD (ATTENTION DEFICIT HYPERACTIVITY DISORDER), COMBINED TYPE: ICD-10-CM

## 2021-07-08 DIAGNOSIS — F41.9 ANXIETY: Primary | ICD-10-CM

## 2021-07-08 DIAGNOSIS — F33.1 MODERATE EPISODE OF RECURRENT MAJOR DEPRESSIVE DISORDER (HCC): ICD-10-CM

## 2021-07-08 PROCEDURE — 99213 OFFICE O/P EST LOW 20 MIN: CPT | Performed by: NURSE PRACTITIONER

## 2021-07-08 RX ORDER — DULOXETIN HYDROCHLORIDE 30 MG/1
30 CAPSULE, DELAYED RELEASE ORAL DAILY
Qty: 30 CAPSULE | Refills: 2 | Status: SHIPPED | OUTPATIENT
Start: 2021-07-08 | End: 2021-09-07 | Stop reason: SDUPTHER

## 2021-07-08 RX ORDER — METHYLPHENIDATE HYDROCHLORIDE 30 MG/1
30 CAPSULE, EXTENDED RELEASE ORAL EVERY MORNING
Qty: 30 CAPSULE | Refills: 0 | Status: SHIPPED | OUTPATIENT
Start: 2021-07-08 | End: 2021-09-07 | Stop reason: SDUPTHER

## 2021-07-08 NOTE — PROGRESS NOTES
Patient Name: Hanny Carbone  MRN: [id number]   :  2000     Chief Complaint:      ICD-10-CM ICD-9-CM   1. Anxiety  F41.9 300.00   2. Moderate episode of recurrent major depressive disorder (CMS/[facility])  F33.1 296.32   3. ADHD (attention deficit hyperactivity disorder), combined type  F90.2 314.01       History of Present Illness: Hanny Carbone is a 21 y.o. female is here today for medication management follow up.  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  Patient is concerned she will get anxious again once fullterm starts and she is not outside as much.  For right now she is doing very well.    The following portions of the patient's history were reviewed and updated as appropriate: allergies, current medications, past family history, past medical history, past social history, past surgical history and problem list.    Review of Systems;;  Review of Systems   Constitutional: Negative for activity change, appetite change, fatigue, unexpected weight gain and unexpected weight loss.   Respiratory: Negative for shortness of breath and wheezing.    Gastrointestinal: Negative for constipation, diarrhea, nausea and vomiting.   Musculoskeletal: Negative for gait problem.   Skin: Negative for dry skin and rash.   Neurological: Negative for dizziness, speech difficulty, weakness, light-headedness, headache, memory problem and confusion.   Psychiatric/Behavioral: Negative for agitation, behavioral problems, decreased concentration, dysphoric mood, hallucinations, self-injury, sleep disturbance, suicidal ideas, negative for hyperactivity, depressed mood and stress. The patient is not nervous/anxious.        Physical Exam;;  Physical Exam  Vitals and nursing note reviewed.   Constitutional:       General: She is not in acute distress.     Appearance: She is well-developed. She is not diaphoretic.   HENT:      Head: Normocephalic and atraumatic.   Eyes:       "Conjunctiva/sclera: Conjunctivae normal.   Cardiovascular:      Rate and Rhythm: Normal rate.   Pulmonary:      Effort: Pulmonary effort is normal. No respiratory distress.   Musculoskeletal:         General: Normal range of motion.      Cervical back: Full passive range of motion without pain and normal range of motion.   Skin:     General: Skin is warm and dry.   Neurological:      Mental Status: She is alert and oriented to person, place, and time.   Psychiatric:         Mood and Affect: Mood is not anxious or depressed. Affect is not labile, blunt, angry or inappropriate.         Speech: Speech is not rapid and pressured or tangential.         Behavior: Behavior normal. Behavior is not agitated, slowed, aggressive, withdrawn, hyperactive or combative. Behavior is cooperative.         Thought Content: Thought content normal. Thought content is not paranoid or delusional. Thought content does not include homicidal or suicidal ideation. Thought content does not include homicidal or suicidal plan.         Judgment: Judgment normal.       Blood pressure 122/72, height 165.1 cm (65\"), weight 99.7 kg (219 lb 12.8 oz).  Body mass index is 36.58 kg/m².    Current Medications;;    Current Outpatient Medications:   •  benzoyl peroxide 10 % gel, , Disp: , Rfl:   •  Betasept Surgical Scrub 4 % external liquid, , Disp: , Rfl:   •  clindamycin (CLEOCIN T) 1 % lotion, , Disp: , Rfl:   •  cyproheptadine (PERIACTIN) 4 MG tablet, Take 1 tablet by mouth 2 (Two) Times a Day., Disp: 60 tablet, Rfl: 2  •  dapsone 100 MG tablet, , Disp: , Rfl:   •  DULoxetine (Cymbalta) 30 MG capsule, Take 1 capsule by mouth Daily., Disp: 30 capsule, Rfl: 2  •  fexofenadine (ALLEGRA) 60 MG tablet, Take 60 mg by mouth Daily., Disp: , Rfl:   •  ibuprofen (ADVIL,MOTRIN) 200 MG tablet, Take 200 mg by mouth Every 6 (Six) Hours As Needed for Mild Pain ., Disp: , Rfl:   •  methylphenidate LA (Ritalin LA) 30 MG 24 hr capsule, Take 1 capsule by mouth Every " Morning, Disp: 30 capsule, Rfl: 0  •  minocycline (MINOCIN,DYNACIN) 100 MG capsule, , Disp: , Rfl:     Lab Results:   No visits with results within 3 Month(s) from this visit.   Latest known visit with results is:   Office Visit on 12/22/2020   Component Date Value Ref Range Status   • Report Summary 12/22/2020 FINAL   Final    Comment: ====================================================================  [facility] COMP DRUG ANALYSIS,[facility]  ====================================================================  Test                             Result       Flag       Units  Drug Present and Declared for Prescription Verification    Duloxetine                     PRESENT      EXPECTED  Drug Present not Declared for Prescription Verification    Carboxy-THC                    >386         UNEXPECTED ng/mg creat     Carboxy-THC is a metabolite of tetrahydrocannabinol  (THC).     Source of THC is most commonly illicit, but THC is also present     in a scheduled prescription medication.  Drug Absent but Declared for Prescription Verification    Methylphenidate                Not Detected UNEXPECTED    Ibuprofen                      Not Detected UNEXPECTED     Ibuprofen, as indicated in the declared medication list, is not     always detected even when used as directed.  ===========================================================                           =========  Test                      Result    Flag   Units      Ref Range    Creatinine              259              mg/dL      >=20  ====================================================================  Declared Medications:   The flagging and interpretation on this report are based on the   following declared medications.  Unexpected results may arise from   inaccuracies in the declared medications.   **Note: The testing scope of this panel includes these medications:   Duloxetine (Cymbalta)   Methylphenidate (Ritalin)   **Note: The testing scope of this panel does not  include small to   moderate amounts of these reported medications:   Ibuprofen (Advil)   **Note: The testing scope of this panel does not include following   reported medications:   Fexofenadine (Allegra)   Vitamin B12   Vitamin D2 (Ergocalciferol)  ====================================================================  For clinical consultation, please call [phone].  ============================================                           ========================         Mental Status Exam:   Hygiene:   good  Cooperation:  Cooperative  Eye Contact:  Good  Psychomotor Behavior:  Appropriate  Mood:anxious and depressed  Affect:  Appropriate  Hopelessness: Denies  Speech:  Normal  Thought Process:  Goal directed  Thought Content:  Normal  Suicidal:  None  Homicidal:  None  Hallucinations:  None  Delusion:  None  Memory:  Intact  Orientation:  Person, Place, Time and Situation  Reliability:  good  Insight:  Good  Judgement:  Good  Impulse Control:  Good  Physical/Medical Issues:  No     PHQ-9 Depression Screening  Little interest or pleasure in doing things? 1   Feeling down, depressed, or hopeless? 0   Trouble falling or staying asleep, or sleeping too much?     Feeling tired or having little energy?     Poor appetite or overeating?     Feeling bad about yourself - or that you are a failure or have let yourself or your family down?     Trouble concentrating on things, such as reading the newspaper or watching television?     Moving or speaking so slowly that other people could have noticed? Or the opposite - being so fidgety or restless that you have been moving around a lot more than usual?     Thoughts that you would be better off dead, or of hurting yourself in some way?     PHQ-9 Total Score 1   If you checked off any problems, how difficult have these problems made it for you to do your work, take care of things at home, or get along with other people?          Assessment/Plan:  Diagnoses and all orders for  this visit:    1. Anxiety (Primary)  -     DULoxetine (Cymbalta) 30 MG capsule; Take 1 capsule by mouth Daily.  Dispense: 30 capsule; Refill: 2    2. Moderate episode of recurrent major depressive disorder (CMS/HCC)  -     DULoxetine (Cymbalta) 30 MG capsule; Take 1 capsule by mouth Daily.  Dispense: 30 capsule; Refill: 2    3. ADHD (attention deficit hyperactivity disorder), combined type  -     methylphenidate LA (Ritalin LA) 30 MG 24 hr capsule; Take 1 capsule by mouth Every Morning  Dispense: 30 capsule; Refill: 0      Continue medication as ordered.    A psychological evaluation was conducted in order to assess past and current level of functioning. Areas assessed included, but were not limited to: perception of social support, perception of ability to face and deal with challenges in life (positive functioning), anxiety symptoms, depressive symptoms, perspective on beliefs/belief system, coping skills for stress, intelligence level,  Therapeutic rapport was established. Interventions conducted today were geared towards incorporating medication management along with support for continued therapy. Education was also provided as to the med management with this provider and what to expect in subsequent sessions.    We discussed risks, benefits,goals and side effects of the above medication and the patient was agreeable with the plan.Patient was educated on the importance of compliance with treatment and follow-up appointments. Patient is aware to contact the [facility] with any worsening of symptoms. To call for questions or concerns and return early if necessary. Patent is agreeable to go to the Emergency Department or call 911 should they begin SI/HI.     Treatment Plan:   Discussed risks, benefits, and alternatives of medication. Encouraged healthy habits (eating, exercise and sleep). Call if any questions or problems arise. Medication reconciled. Controlled substance monitoring report reviewed. Provided  psychoeducation.. Discussed coping strategies and current stressors. Set appropriate boundaries and limits for patient's well-being. Use distraction techniques to improve symptoms. Access support networks.      Return in about 2 months (around 9/8/2021) for Follow Up 15 min.    Aviva Hallman, APRN

## 2021-08-14 ENCOUNTER — HOSPITAL ENCOUNTER (EMERGENCY)
Facility: HOSPITAL | Age: 21
Discharge: HOME | End: 2021-08-15
Attending: EMERGENCY MEDICINE | Admitting: EMERGENCY MEDICINE

## 2021-08-14 ENCOUNTER — APPOINTMENT (OUTPATIENT)
Dept: CT IMAGING | Facility: HOSPITAL | Age: 21
End: 2021-08-14

## 2021-08-14 DIAGNOSIS — S61.011A LACERATION OF RIGHT THUMB WITHOUT FOREIGN BODY WITHOUT DAMAGE TO NAIL, INITIAL ENCOUNTER: Primary | ICD-10-CM

## 2021-08-14 PROCEDURE — 99283 EMERGENCY DEPT VISIT LOW MDM: CPT

## 2021-08-14 PROCEDURE — 99282 EMERGENCY DEPT VISIT SF MDM: CPT

## 2021-08-14 PROCEDURE — 70450 CT HEAD/BRAIN W/O DYE: CPT

## 2021-08-14 RX ORDER — LIDOCAINE HYDROCHLORIDE 10 MG/ML
10 INJECTION, SOLUTION INFILTRATION; PERINEURAL ONCE
Status: COMPLETED | OUTPATIENT
Start: 2021-08-14 | End: 2021-08-15

## 2021-08-15 VITALS
WEIGHT: 223 LBS | HEART RATE: 72 BPM | BODY MASS INDEX: 37.15 KG/M2 | OXYGEN SATURATION: 98 % | TEMPERATURE: 98.5 F | DIASTOLIC BLOOD PRESSURE: 88 MMHG | RESPIRATION RATE: 17 BRPM | SYSTOLIC BLOOD PRESSURE: 128 MMHG | HEIGHT: 65 IN

## 2021-08-15 PROCEDURE — 25010000003 LIDOCAINE 1 % SOLUTION: Performed by: PHYSICIAN ASSISTANT

## 2021-08-15 RX ADMIN — LIDOCAINE HYDROCHLORIDE 10 ML: 10 INJECTION, SOLUTION INFILTRATION; PERINEURAL at 00:47

## 2021-08-15 NOTE — ED PROVIDER NOTES
Subjective   21-year-old female reports to the emergency room for laceration on the right thumb.  She states that she cut her thumb while opening a can in the kitchen.  Patient states that she received her tetanus shot in January 2019 and is current.  Patient states that she had half a bottle of wine this evening as well as smoke marijuana.  Patient states that while she was waiting to be seen in the emergency department she passed out and thinks that she hit her head when she fell.          Review of Systems   Constitutional: Negative.    HENT: Negative.    Eyes: Negative.    Respiratory: Negative.    Cardiovascular: Negative.    Gastrointestinal: Negative.    Endocrine: Negative.    Genitourinary: Negative.    Musculoskeletal: Negative.    Skin: Positive for wound (Laceration to the right thumb).   Allergic/Immunologic: Negative.    Neurological: Negative.    Hematological: Negative.    Psychiatric/Behavioral: Negative.        Past Medical History:   Diagnosis Date   • Anemia    • Anxiety    • Depression    • IBS (irritable bowel syndrome)    • Scoliosis        No Known Allergies    History reviewed. No pertinent surgical history.    Family History   Problem Relation Age of Onset   • Osteosarcoma Brother    • Diabetes Other    • Mental illness Other    • Migraines Other    • Obesity Other        Social History     Socioeconomic History   • Marital status: Single     Spouse name: Not on file   • Number of children: Not on file   • Years of education: Not on file   • Highest education level: Not on file   Tobacco Use   • Smoking status: Former Smoker   • Smokeless tobacco: Never Used   • Tobacco comment: vape, planning to quit   Vaping Use   • Vaping Use: Every day   • Substances: Nicotine   • Devices: Disposable   Substance and Sexual Activity   • Alcohol use: Yes   • Drug use: Yes     Types: Marijuana   • Sexual activity: Never           Objective   Physical Exam  Vitals and nursing note reviewed.    Constitutional:       Appearance: She is well-developed.   Cardiovascular:      Rate and Rhythm: Normal rate and regular rhythm.   Pulmonary:      Effort: Pulmonary effort is normal.      Breath sounds: Normal breath sounds.   Abdominal:      General: Bowel sounds are normal.      Palpations: Abdomen is soft.   Musculoskeletal:         General: Normal range of motion.      Cervical back: Normal range of motion and neck supple.   Skin:     General: Skin is warm and dry.      Comments: Laceration to the right thumb.   Neurological:      Mental Status: She is alert and oriented to person, place, and time.      Deep Tendon Reflexes: Reflexes are normal and symmetric.   Psychiatric:         Mood and Affect: Mood normal.         Laceration Repair    Date/Time: 8/15/2021 12:39 AM  Performed by: Zach Romero Jr., PA-C  Authorized by: Khadra Pinzon MD     Consent:     Consent obtained:  Verbal    Consent given by:  Patient    Risks discussed:  Infection, retained foreign body and pain  Anesthesia (see MAR for exact dosages):     Anesthesia method:  Local infiltration    Local anesthetic:  Lidocaine 1% w/o epi  Laceration details:     Location:  Finger    Finger location:  R thumb    Length (cm):  2    Depth (mm):  2  Repair type:     Repair type:  Simple  Pre-procedure details:     Preparation:  Patient was prepped and draped in usual sterile fashion  Exploration:     Hemostasis achieved with:  Direct pressure    Wound exploration: wound explored through full range of motion and entire depth of wound probed and visualized      Contaminated: no    Treatment:     Area cleansed with:  Hibiclens    Amount of cleaning:  Standard    Irrigation solution:  Sterile saline    Irrigation method:  Syringe    Visualized foreign bodies/material removed: no    Skin repair:     Repair method:  Sutures    Suture size:  4-0    Suture material:  Nylon    Suture technique:  Simple interrupted    Number of sutures:   3  Approximation:     Approximation:  Close  Post-procedure details:     Dressing:  Non-adherent dressing    Patient tolerance of procedure:  Tolerated well, no immediate complications               ED Course                                           MDM  Number of Diagnoses or Management Options  Laceration of right thumb without foreign body without damage to nail, initial encounter: new and requires workup     Amount and/or Complexity of Data Reviewed  Tests in the radiology section of CPT®: reviewed    Risk of Complications, Morbidity, and/or Mortality  Presenting problems: minimal  Diagnostic procedures: minimal  Management options: minimal    Patient Progress  Patient progress: stable      Final diagnoses:   Laceration of right thumb without foreign body without damage to nail, initial encounter       ED Disposition  ED Disposition     ED Disposition Condition Comment    Discharge Stable           [facility] Emergency Department  [address] [address] [phone]  In 10 days  For suture removal         Medication List      No changes were made to your prescriptions during this visit.          Zach Romero Jr., PA-C  08/15/21 0046

## 2021-09-07 ENCOUNTER — OFFICE VISIT (OUTPATIENT)
Dept: BEHAVIORAL HEALTH | Facility: CLINIC | Age: 21
End: 2021-09-07

## 2021-09-07 VITALS — WEIGHT: 226 LBS | HEIGHT: 65 IN | BODY MASS INDEX: 37.65 KG/M2

## 2021-09-07 DIAGNOSIS — F90.2 ADHD (ATTENTION DEFICIT HYPERACTIVITY DISORDER), COMBINED TYPE: ICD-10-CM

## 2021-09-07 DIAGNOSIS — F33.1 MODERATE EPISODE OF RECURRENT MAJOR DEPRESSIVE DISORDER (HCC): ICD-10-CM

## 2021-09-07 DIAGNOSIS — F41.9 ANXIETY: ICD-10-CM

## 2021-09-07 PROCEDURE — 99213 OFFICE O/P EST LOW 20 MIN: CPT | Performed by: NURSE PRACTITIONER

## 2021-09-07 RX ORDER — DULOXETIN HYDROCHLORIDE 30 MG/1
30 CAPSULE, DELAYED RELEASE ORAL DAILY
Qty: 30 CAPSULE | Refills: 2 | Status: SHIPPED | OUTPATIENT
Start: 2021-09-07 | End: 2021-10-19 | Stop reason: SDUPTHER

## 2021-09-07 RX ORDER — METHYLPHENIDATE HYDROCHLORIDE 30 MG/1
30 CAPSULE, EXTENDED RELEASE ORAL EVERY MORNING
Qty: 30 CAPSULE | Refills: 0 | Status: SHIPPED | OUTPATIENT
Start: 2021-09-07 | End: 2021-10-19

## 2021-09-07 RX ORDER — METHYLPHENIDATE HYDROCHLORIDE 10 MG/1
TABLET ORAL
Qty: 30 TABLET | Refills: 0 | Status: SHIPPED | OUTPATIENT
Start: 2021-09-07 | End: 2021-10-19 | Stop reason: SDUPTHER

## 2021-09-07 NOTE — PROGRESS NOTES
Patient Name: Hanny Carbone  MRN: [id number]   :  2000     Chief Complaint:      ICD-10-CM ICD-9-CM   1. Anxiety  F41.9 300.00   2. Moderate episode of recurrent major depressive disorder (CMS/HCC)  F33.1 296.32   3. ADHD (attention deficit hyperactivity disorder), combined type  F90.2 314.01       History of Present Illness: Hanny Carbone is a 21 y.o. female is here today for medication management follow up.  Patient is under a great deal of stress currently.  Patient states she has a move out date at her apartment but not a move-in date at her new apartment.  Also has many midterms this week.  Living out of a suitcase in her old apartment.  Patient states she struggles with adherence with her medication.  Patient states she does have issues on longer days with the morning Ritalin not lasting as long as she needs.  Patient states it is an adjustment getting [place]  Currently working 3 different jobs have 1 night of having very intrusive thoughts.  The following portions of the patient's history were reviewed and updated as appropriate: allergies, current medications, past family history, past medical history, past social history, past surgical history and problem list.    Review of Systems;;  Review of Systems   Constitutional: Negative for activity change, appetite change, fatigue, unexpected weight gain and unexpected weight loss.   Respiratory: Negative for shortness of breath and wheezing.    Gastrointestinal: Negative for constipation, diarrhea, nausea and vomiting.   Musculoskeletal: Negative for gait problem.   Skin: Negative for dry skin and rash.   Neurological: Negative for dizziness, speech difficulty, weakness, light-headedness, headache, memory problem and confusion.   Psychiatric/Behavioral: Positive for dysphoric mood and stress. Negative for agitation, behavioral problems, decreased concentration, hallucinations, self-injury, sleep  "disturbance, suicidal ideas, negative for hyperactivity and depressed mood. The patient is nervous/anxious.        Physical Exam;;  Physical Exam  Vitals and nursing note reviewed.   Constitutional:       General: She is not in acute distress.     Appearance: She is well-developed. She is not diaphoretic.   HENT:      Head: Normocephalic and atraumatic.   Eyes:      Conjunctiva/sclera: Conjunctivae normal.   Cardiovascular:      Rate and Rhythm: Normal rate.   Pulmonary:      Effort: Pulmonary effort is normal. No respiratory distress.   Musculoskeletal:         General: Normal range of motion.      Cervical back: Full passive range of motion without pain and normal range of motion.   Skin:     General: Skin is warm and dry.   Neurological:      Mental Status: She is alert and oriented to person, place, and time.   Psychiatric:         Mood and Affect: Mood is anxious and depressed. Affect is not labile, blunt, angry or inappropriate.         Speech: Speech is not rapid and pressured or tangential.         Behavior: Behavior normal. Behavior is not agitated, slowed, aggressive, withdrawn, hyperactive or combative. Behavior is cooperative.         Thought Content: Thought content normal. Thought content is not paranoid or delusional. Thought content does not include homicidal or suicidal ideation. Thought content does not include homicidal or suicidal plan.         Judgment: Judgment normal.       Height 165.1 cm (65\"), weight 103 kg (226 lb).  Body mass index is 37.61 kg/m².    Current Medications;;    Current Outpatient Medications:   •  benzoyl peroxide 10 % gel, , Disp: , Rfl:   •  Betasept Surgical Scrub 4 % external liquid, , Disp: , Rfl:   •  cyproheptadine (PERIACTIN) 4 MG tablet, Take 1 tablet by mouth 2 (Two) Times a Day., Disp: 60 tablet, Rfl: 2  •  DULoxetine (Cymbalta) 30 MG capsule, Take 1 capsule by mouth Daily., Disp: 30 capsule, Rfl: 2  •  fexofenadine (ALLEGRA) 60 MG tablet, Take 60 mg by mouth " Daily., Disp: , Rfl:   •  ibuprofen (ADVIL,MOTRIN) 200 MG tablet, Take 200 mg by mouth Every 6 (Six) Hours As Needed for Mild Pain ., Disp: , Rfl:   •  methylphenidate LA (Ritalin LA) 30 MG 24 hr capsule, Take 1 capsule by mouth Every Morning, Disp: 30 capsule, Rfl: 0  •  methylphenidate (RITALIN) 10 MG tablet, Take 10 mg orally every afternoon, Disp: 30 tablet, Rfl: 0    Lab Results:   No visits with results within 3 Month(s) from this visit.   Latest known visit with results is:   Office Visit on 12/22/2020   Component Date Value Ref Range Status   • Report Summary 12/22/2020 FINAL   Final    Comment: ====================================================================  [facility] COMP DRUG ANALYSIS,[facility]  ====================================================================  Test                             Result       Flag       Units  Drug Present and Declared for Prescription Verification    Duloxetine                     PRESENT      EXPECTED  Drug Present not Declared for Prescription Verification    Carboxy-THC                    >386         UNEXPECTED ng/mg creat     Carboxy-THC is a metabolite of tetrahydrocannabinol  (THC).     Source of THC is most commonly illicit, but THC is also present     in a scheduled prescription medication.  Drug Absent but Declared for Prescription Verification    Methylphenidate                Not Detected UNEXPECTED    Ibuprofen                      Not Detected UNEXPECTED     Ibuprofen, as indicated in the declared medication list, is not     always detected even when used as directed.  ===========================================================                           =========  Test                      Result    Flag   Units      Ref Range    Creatinine              259              mg/dL      >=20  ====================================================================  Declared Medications:   The flagging and interpretation on this report are based on the   following  declared medications.  Unexpected results may arise from   inaccuracies in the declared medications.   **Note: The testing scope of this panel includes these medications:   Duloxetine (Cymbalta)   Methylphenidate (Ritalin)   **Note: The testing scope of this panel does not include small to   moderate amounts of these reported medications:   Ibuprofen (Advil)   **Note: The testing scope of this panel does not include following   reported medications:   Fexofenadine (Allegra)   Vitamin B12   Vitamin D2 (Ergocalciferol)  ====================================================================  For clinical consultation, please call [phone].  ============================================                           ========================         Mental Status Exam:   Hygiene:   good  Cooperation:  Cooperative  Eye Contact:  Good  Psychomotor Behavior:  Appropriate  Mood:anxious and depressed  Affect:  Appropriate  Hopelessness: Denies  Speech:  Normal  Thought Process:  Goal directed  Thought Content:  Normal  Suicidal:  None  Homicidal:  None  Hallucinations:  None  Delusion:  None  Memory:  Intact  Orientation:  Person, Place, Time and Situation  Reliability:  good  Insight:  Good  Judgement:  Good  Impulse Control:  Good  Physical/Medical Issues:  No     PHQ-9 Depression Screening  Little interest or pleasure in doing things? 1   Feeling down, depressed, or hopeless? 1   Trouble falling or staying asleep, or sleeping too much? 1   Feeling tired or having little energy? 2   Poor appetite or overeating? 2   Feeling bad about yourself - or that you are a failure or have let yourself or your family down? 0   Trouble concentrating on things, such as reading the newspaper or watching television? 2   Moving or speaking so slowly that other people could have noticed? Or the opposite - being so fidgety or restless that you have been moving around a lot more than usual? 0   Thoughts that you would be better off dead, or of  hurting yourself in some way? 0   PHQ-9 Total Score 9   If you checked off any problems, how difficult have these problems made it for you to do your work, take care of things at home, or get along with other people? Somewhat difficult        Assessment/Plan:  Diagnoses and all orders for this visit:    1. Anxiety  -     DULoxetine (Cymbalta) 30 MG capsule; Take 1 capsule by mouth Daily.  Dispense: 30 capsule; Refill: 2    2. Moderate episode of recurrent major depressive disorder (CMS/HCC)  -     DULoxetine (Cymbalta) 30 MG capsule; Take 1 capsule by mouth Daily.  Dispense: 30 capsule; Refill: 2    3. ADHD (attention deficit hyperactivity disorder), combined type  -     methylphenidate LA (Ritalin LA) 30 MG 24 hr capsule; Take 1 capsule by mouth Every Morning  Dispense: 30 capsule; Refill: 0  -     methylphenidate (RITALIN) 10 MG tablet; Take 10 mg orally every afternoon  Dispense: 30 tablet; Refill: 0      Will add an afternoon booster of Ritalin 10 mg to take around 1:00 on longer days.  We will continue rest of medication and encourage compliance.    A psychological evaluation was conducted in order to assess past and current level of functioning. Areas assessed included, but were not limited to: perception of social support, perception of ability to face and deal with challenges in life (positive functioning), anxiety symptoms, depressive symptoms, perspective on beliefs/belief system, coping skills for stress, intelligence level,  Therapeutic rapport was established. Interventions conducted today were geared towards incorporating medication management along with support for continued therapy. Education was also provided as to the med management with this provider and what to expect in subsequent sessions.    We discussed risks, benefits,goals and side effects of the above medication and the patient was agreeable with the plan.Patient was educated on the importance of compliance with treatment and follow-up  appointments. Patient is aware to contact the [facility] with any worsening of symptoms. To call for questions or concerns and return early if necessary. Patent is agreeable to go to the Emergency Department or call 911 should they begin SI/HI.     Treatment Plan:   Discussed risks, benefits, and alternatives of medication. Encouraged healthy habits (eating, exercise and sleep). Call if any questions or problems arise. Medication reconciled. Controlled substance monitoring report reviewed. Provided psychoeducation.. Discussed coping strategies and current stressors. Set appropriate boundaries and limits for patient's well-being. Use distraction techniques to improve symptoms. Access support networks.      Return in about 4 weeks (around 10/5/2021) for Follow Up 15 min.    Aviva Hallman, SEBLE

## 2021-10-19 ENCOUNTER — TELEMEDICINE (OUTPATIENT)
Dept: BEHAVIORAL HEALTH | Facility: CLINIC | Age: 21
End: 2021-10-19

## 2021-10-19 DIAGNOSIS — F33.1 MODERATE EPISODE OF RECURRENT MAJOR DEPRESSIVE DISORDER (HCC): ICD-10-CM

## 2021-10-19 DIAGNOSIS — F41.9 ANXIETY: Primary | ICD-10-CM

## 2021-10-19 DIAGNOSIS — F90.2 ADHD (ATTENTION DEFICIT HYPERACTIVITY DISORDER), COMBINED TYPE: ICD-10-CM

## 2021-10-19 PROCEDURE — 99213 OFFICE O/P EST LOW 20 MIN: CPT | Performed by: NURSE PRACTITIONER

## 2021-10-19 RX ORDER — METHYLPHENIDATE HYDROCHLORIDE 20 MG/1
20 CAPSULE, EXTENDED RELEASE ORAL EVERY MORNING
Qty: 30 CAPSULE | Refills: 0 | Status: SHIPPED | OUTPATIENT
Start: 2021-10-19 | End: 2022-04-21

## 2021-10-19 RX ORDER — METHYLPHENIDATE HYDROCHLORIDE 10 MG/1
TABLET ORAL
Qty: 30 TABLET | Refills: 0 | Status: SHIPPED | OUTPATIENT
Start: 2021-10-19 | End: 2022-04-21

## 2021-10-19 RX ORDER — DULOXETIN HYDROCHLORIDE 30 MG/1
30 CAPSULE, DELAYED RELEASE ORAL DAILY
Qty: 30 CAPSULE | Refills: 2 | Status: SHIPPED | OUTPATIENT
Start: 2021-10-19 | End: 2022-01-20 | Stop reason: SDUPTHER

## 2021-10-19 NOTE — PROGRESS NOTES
Patient Name: Hanny Carbone  MRN: [id number]   :  2000     Chief Complaint:      ICD-10-CM ICD-9-CM   1. Anxiety  F41.9 300.00   2. Moderate episode of recurrent major depressive disorder (HCC)  F33.1 296.32   3. ADHD (attention deficit hyperactivity disorder), combined type  F90.2 314.01       History of Present Illness: Hanny Carbone is a 21 y.o. female is here today for medication management follow up.  Patient states she feels she is getting irritable while on her ADHD medication.  Feels anxiety seems to be well-controlled as well as mood. The following portions of the patient's history were reviewed and updated as appropriate: allergies, current medications, past family history, past medical history, past social history, past surgical history and problem list.    Review of Systems;;  Review of Systems   Constitutional: Negative for activity change, appetite change, fatigue, unexpected weight gain and unexpected weight loss.   Respiratory: Negative for shortness of breath and wheezing.    Gastrointestinal: Negative for constipation, diarrhea, nausea and vomiting.   Musculoskeletal: Negative for gait problem.   Skin: Negative for dry skin and rash.   Neurological: Negative for dizziness, speech difficulty, weakness, light-headedness, headache, memory problem and confusion.   Psychiatric/Behavioral: Positive for dysphoric mood and stress. Negative for agitation, behavioral problems, decreased concentration, hallucinations, self-injury, sleep disturbance, suicidal ideas, negative for hyperactivity and depressed mood. The patient is nervous/anxious.        Physical Exam;;  Physical Exam  Vitals and nursing note reviewed.   Constitutional:       General: She is not in acute distress.     Appearance: She is well-developed. She is not diaphoretic.   HENT:      Head: Normocephalic and atraumatic.   Eyes:      Conjunctiva/sclera: Conjunctivae normal.   Cardiovascular:      Rate and Rhythm: Normal rate.   Pulmonary:       Effort: Pulmonary effort is normal. No respiratory distress.   Musculoskeletal:         General: Normal range of motion.      Cervical back: Full passive range of motion without pain and normal range of motion.   Skin:     General: Skin is warm and dry.   Neurological:      Mental Status: She is alert and oriented to person, place, and time.   Psychiatric:         Mood and Affect: Mood is anxious and depressed. Affect is not labile, blunt, angry or inappropriate.         Speech: Speech is not rapid and pressured or tangential.         Behavior: Behavior normal. Behavior is not agitated, slowed, aggressive, withdrawn, hyperactive or combative. Behavior is cooperative.         Thought Content: Thought content normal. Thought content is not paranoid or delusional. Thought content does not include homicidal or suicidal ideation. Thought content does not include homicidal or suicidal plan.         Judgment: Judgment normal.       There were no vitals taken for this visit.  There is no height or weight on file to calculate BMI.    Current Medications;;    Current Outpatient Medications:   •  benzoyl peroxide 10 % gel, , Disp: , Rfl:   •  cyproheptadine (PERIACTIN) 4 MG tablet, Take 1 tablet by mouth 2 (Two) Times a Day., Disp: 60 tablet, Rfl: 2  •  DULoxetine (Cymbalta) 30 MG capsule, Take 1 capsule by mouth Daily., Disp: 30 capsule, Rfl: 2  •  fexofenadine (ALLEGRA) 60 MG tablet, Take 60 mg by mouth Daily., Disp: , Rfl:   •  ibuprofen (ADVIL,MOTRIN) 200 MG tablet, Take 200 mg by mouth Every 6 (Six) Hours As Needed for Mild Pain ., Disp: , Rfl:   •  methylphenidate (RITALIN) 10 MG tablet, Take 10 mg orally every afternoon, Disp: 30 tablet, Rfl: 0  •  methylphenidate LA (Ritalin LA) 20 MG 24 hr capsule, Take 1 capsule by mouth Every Morning, Disp: 30 capsule, Rfl: 0    Lab Results:   No visits with results within 3 Month(s) from this visit.   Latest known visit with results is:   Office Visit on 12/22/2020   Component  Date Value Ref Range Status   • Report Summary 12/22/2020 FINAL   Final    Comment: ====================================================================  [facility] COMP DRUG ANALYSIS,[facility]  ====================================================================  Test                             Result       Flag       Units  Drug Present and Declared for Prescription Verification    Duloxetine                     PRESENT      EXPECTED  Drug Present not Declared for Prescription Verification    Carboxy-THC                    >386         UNEXPECTED ng/mg creat     Carboxy-THC is a metabolite of tetrahydrocannabinol  (THC).     Source of THC is most commonly illicit, but THC is also present     in a scheduled prescription medication.  Drug Absent but Declared for Prescription Verification    Methylphenidate                Not Detected UNEXPECTED    Ibuprofen                      Not Detected UNEXPECTED     Ibuprofen, as indicated in the declared medication list, is not     always detected even when used as directed.  ===========================================================                           =========  Test                      Result    Flag   Units      Ref Range    Creatinine              259              mg/dL      >=20  ====================================================================  Declared Medications:   The flagging and interpretation on this report are based on the   following declared medications.  Unexpected results may arise from   inaccuracies in the declared medications.   **Note: The testing scope of this panel includes these medications:   Duloxetine (Cymbalta)   Methylphenidate (Ritalin)   **Note: The testing scope of this panel does not include small to   moderate amounts of these reported medications:   Ibuprofen (Advil)   **Note: The testing scope of this panel does not include following   reported medications:   Fexofenadine (Allegra)   Vitamin B12   Vitamin D2  (Ergocalciferol)  ====================================================================  For clinical consultation, please call [phone].  ============================================                           ========================         Mental Status Exam:   Hygiene:   good  Cooperation:  Cooperative  Eye Contact:  Good  Psychomotor Behavior:  Appropriate  Mood:anxious and depressed  Affect:  Appropriate  Hopelessness: Denies  Speech:  Normal  Thought Process:  Goal directed  Thought Content:  Normal  Suicidal:  None  Homicidal:  None  Hallucinations:  None  Delusion:  None  Memory:  Intact  Orientation:  Person, Place, Time and Situation  Reliability:  good  Insight:  Good  Judgement:  Good  Impulse Control:  Good  Physical/Medical Issues:  No     PHQ-9 Depression Screening  Little interest or pleasure in doing things? 1   Feeling down, depressed, or hopeless? 1   Trouble falling or staying asleep, or sleeping too much? 1 (Sleeping too much)   Feeling tired or having little energy? 0   Poor appetite or overeating? 0   Feeling bad about yourself - or that you are a failure or have let yourself or your family down? 0   Trouble concentrating on things, such as reading the newspaper or watching television? 3   Moving or speaking so slowly that other people could have noticed? Or the opposite - being so fidgety or restless that you have been moving around a lot more than usual? 0   Thoughts that you would be better off dead, or of hurting yourself in some way? 0   PHQ-9 Total Score 6   If you checked off any problems, how difficult have these problems made it for you to do your work, take care of things at home, or get along with other people? Somewhat difficult        Assessment/Plan:  Diagnoses and all orders for this visit:    1. Anxiety (Primary)  -     DULoxetine (Cymbalta) 30 MG capsule; Take 1 capsule by mouth Daily.  Dispense: 30 capsule; Refill: 2    2. Moderate episode of recurrent major depressive  disorder (HCC)  -     DULoxetine (Cymbalta) 30 MG capsule; Take 1 capsule by mouth Daily.  Dispense: 30 capsule; Refill: 2    3. ADHD (attention deficit hyperactivity disorder), combined type  -     methylphenidate LA (Ritalin LA) 20 MG 24 hr capsule; Take 1 capsule by mouth Every Morning  Dispense: 30 capsule; Refill: 0  -     methylphenidate (RITALIN) 10 MG tablet; Take 10 mg orally every afternoon  Dispense: 30 tablet; Refill: 0    We will try decreasing Ritalin LA to 20 mg every morning.  [facility] video appointment start time 2:59 PM.  End time 3:10 PM.    A psychological evaluation was conducted in order to assess past and current level of functioning. Areas assessed included, but were not limited to: perception of social support, perception of ability to face and deal with challenges in life (positive functioning), anxiety symptoms, depressive symptoms, perspective on beliefs/belief system, coping skills for stress, intelligence level,  Therapeutic rapport was established. Interventions conducted today were geared towards incorporating medication management along with support for continued therapy. Education was also provided as to the med management with this provider and what to expect in subsequent sessions.    We discussed risks, benefits,goals and side effects of the above medication and the patient was agreeable with the plan.Patient was educated on the importance of compliance with treatment and follow-up appointments. Patient is aware to contact the [facility] with any worsening of symptoms. To call for questions or concerns and return early if necessary. Patent is agreeable to go to the Emergency Department or call 911 should they begin SI/HI.     Treatment Plan:   Discussed risks, benefits, and alternatives of medication. Encouraged healthy habits (eating, exercise and sleep). Call if any questions or problems arise. Medication reconciled. Controlled substance monitoring report reviewed. Provided  psychoeducation.. Discussed coping strategies and current stressors. Set appropriate boundaries and limits for patient's well-being. Use distraction techniques to improve symptoms. Access support networks.      Return in about 4 weeks (around 11/16/2021) for Video visit.    Aviva Hallman, APRN

## 2021-12-29 PROCEDURE — U0004 COV-19 TEST NON-CDC HGH THRU: HCPCS | Performed by: NURSE PRACTITIONER

## 2022-01-20 ENCOUNTER — TELEMEDICINE (OUTPATIENT)
Dept: BEHAVIORAL HEALTH | Facility: CLINIC | Age: 22
End: 2022-01-20

## 2022-01-20 DIAGNOSIS — F41.9 ANXIETY: ICD-10-CM

## 2022-01-20 DIAGNOSIS — F33.1 MODERATE EPISODE OF RECURRENT MAJOR DEPRESSIVE DISORDER: ICD-10-CM

## 2022-01-20 DIAGNOSIS — F90.2 ADHD (ATTENTION DEFICIT HYPERACTIVITY DISORDER), COMBINED TYPE: Primary | ICD-10-CM

## 2022-01-20 PROCEDURE — 99213 OFFICE O/P EST LOW 20 MIN: CPT | Performed by: NURSE PRACTITIONER

## 2022-01-20 RX ORDER — DULOXETIN HYDROCHLORIDE 30 MG/1
30 CAPSULE, DELAYED RELEASE ORAL DAILY
Qty: 30 CAPSULE | Refills: 6 | Status: SHIPPED | OUTPATIENT
Start: 2022-01-20 | End: 2022-04-21

## 2022-01-20 NOTE — PROGRESS NOTES
Patient Name: Hanny Carbone  MRN: [id number]   :  2000     Chief Complaint:      ICD-10-CM ICD-9-CM   1. ADHD (attention deficit hyperactivity disorder), combined type  F90.2 314.01   2. Moderate episode of recurrent major depressive disorder (HCC)  F33.1 296.32   3. Anxiety  F41.9 300.00       History of Present Illness: Hanny Carbone is a 21 y.o. female is here today for medication management follow up.  Patient states she does not typically take her ADHD medication states it makes her feel irritated.  States Cymbalta helps her anxiety.  Is able to handle situations better.  Does note she has vivid dreams.    The following portions of the patient's history were reviewed and updated as appropriate: allergies, current medications, past family history, past medical history, past social history, past surgical history and problem list.    Review of Systems;;  Review of Systems   Constitutional: Negative for activity change, appetite change, fatigue, unexpected weight gain and unexpected weight loss.   Respiratory: Negative for shortness of breath and wheezing.    Gastrointestinal: Negative for constipation, diarrhea, nausea and vomiting.   Musculoskeletal: Negative for gait problem.   Skin: Negative for dry skin and rash.   Neurological: Negative for dizziness, speech difficulty, weakness, light-headedness, headache, memory problem and confusion.   Psychiatric/Behavioral: Negative for agitation, behavioral problems, decreased concentration, dysphoric mood, hallucinations, self-injury, sleep disturbance, suicidal ideas, negative for hyperactivity, depressed mood and stress. The patient is not nervous/anxious.        Physical Exam;;  Physical Exam  Vitals and nursing note reviewed.   Constitutional:       General: She is not in acute distress.     Appearance: She is well-developed. She is not diaphoretic.   HENT:      Head: Normocephalic and atraumatic.   Eyes:      Conjunctiva/sclera: Conjunctivae normal.    Cardiovascular:      Rate and Rhythm: Normal rate.   Pulmonary:      Effort: Pulmonary effort is normal. No respiratory distress.   Musculoskeletal:         General: Normal range of motion.      Cervical back: Full passive range of motion without pain and normal range of motion.   Skin:     General: Skin is warm and dry.   Neurological:      Mental Status: She is alert and oriented to person, place, and time.   Psychiatric:         Mood and Affect: Mood is not anxious or depressed. Affect is not labile, blunt, angry or inappropriate.         Speech: Speech is not rapid and pressured or tangential.         Behavior: Behavior normal. Behavior is not agitated, slowed, aggressive, withdrawn, hyperactive or combative. Behavior is cooperative.         Thought Content: Thought content normal. Thought content is not paranoid or delusional. Thought content does not include homicidal or suicidal ideation. Thought content does not include homicidal or suicidal plan.         Judgment: Judgment normal.       Last menstrual period 12/23/2021.  There is no height or weight on file to calculate BMI.    Current Medications;;    Current Outpatient Medications:   •  benzoyl peroxide 10 % gel, , Disp: , Rfl:   •  cyproheptadine (PERIACTIN) 4 MG tablet, Take 1 tablet by mouth 2 (Two) Times a Day., Disp: 60 tablet, Rfl: 2  •  DULoxetine (Cymbalta) 30 MG capsule, Take 1 capsule by mouth Daily., Disp: 30 capsule, Rfl: 2  •  fexofenadine (ALLEGRA) 60 MG tablet, Take 60 mg by mouth Daily., Disp: , Rfl:   •  ibuprofen (ADVIL,MOTRIN) 200 MG tablet, Take 200 mg by mouth Every 6 (Six) Hours As Needed for Mild Pain ., Disp: , Rfl:   •  methylphenidate (RITALIN) 10 MG tablet, Take 10 mg orally every afternoon, Disp: 30 tablet, Rfl: 0  •  methylphenidate LA (Ritalin LA) 20 MG 24 hr capsule, Take 1 capsule by mouth Every Morning, Disp: 30 capsule, Rfl: 0  •  methylPREDNISolone (MEDROL) 4 MG dose pack, Take as directed on package instructions.,  Disp: 21 tablet, Rfl: 0    Lab Results:   Admission on 12/29/2021, Discharged on 12/29/2021   Component Date Value Ref Range Status   • SARS-CoV-2 AICHA 12/29/2021 Not Detected  Not Detected Final   • Rapid Influenza A Ag 12/29/2021 Negative  Negative Final   • Rapid Influenza B Ag 12/29/2021 Negative  Negative Final   • Internal Control 12/29/2021 Passed  Passed Final   • Lot Number 12/29/2021 [id number]   Final   • Expiration Date 12/29/2021 5/31/22   Final       Mental Status Exam:   Hygiene:   good  Cooperation:  Cooperative  Eye Contact:  Good  Psychomotor Behavior:  Appropriate  Mood:within normal limits  Affect:  Appropriate  Hopelessness: Denies  Speech:  Normal  Thought Process:  Goal directed  Thought Content:  Normal  Suicidal:  None  Homicidal:  None  Hallucinations:  None  Delusion:  None  Memory:  Intact  Orientation:  Person, Place, Time and Situation  Reliability:  good  Insight:  Good  Judgement:  Good  Impulse Control:  Good  Physical/Medical Issues:  No     PHQ-9 Depression Screening  Little interest or pleasure in doing things?     Feeling down, depressed, or hopeless?     Trouble falling or staying asleep, or sleeping too much?     Feeling tired or having little energy?     Poor appetite or overeating?     Feeling bad about yourself - or that you are a failure or have let yourself or your family down?     Trouble concentrating on things, such as reading the newspaper or watching television?     Moving or speaking so slowly that other people could have noticed? Or the opposite - being so fidgety or restless that you have been moving around a lot more than usual?     Thoughts that you would be better off dead, or of hurting yourself in some way?     PHQ-9 Total Score     If you checked off any problems, how difficult have these problems made it for you to do your work, take care of things at home, or get along with other people?          Assessment/Plan:  Diagnoses and all orders for this visit:    1.  ADHD (attention deficit hyperactivity disorder), combined type (Primary)    2. Moderate episode of recurrent major depressive disorder (HCC)  -     DULoxetine (Cymbalta) 30 MG capsule; Take 1 capsule by mouth Daily.  Dispense: 30 capsule; Refill: 6    3. Anxiety  -     DULoxetine (Cymbalta) 30 MG capsule; Take 1 capsule by mouth Daily.  Dispense: 30 capsule; Refill: 6      Patient really does not take ADHD medication well.  Feels the Cymbalta is helping.  Will continue Cymbalta.  [facility] video appointment start time 2:23 PM.  End time 2:34 PM.    A psychological evaluation was conducted in order to assess past and current level of functioning. Areas assessed included, but were not limited to: perception of social support, perception of ability to face and deal with challenges in life (positive functioning), anxiety symptoms, depressive symptoms, perspective on beliefs/belief system, coping skills for stress, intelligence level,  Therapeutic rapport was established. Interventions conducted today were geared towards incorporating medication management along with support for continued therapy. Education was also provided as to the med management with this provider and what to expect in subsequent sessions.    We discussed risks, benefits,goals and side effects of the above medication and the patient was agreeable with the plan.Patient was educated on the importance of compliance with treatment and follow-up appointments. Patient is aware to contact the [facility] with any worsening of symptoms. To call for questions or concerns and return early if necessary. Patent is agreeable to go to the Emergency Department or call 911 should they begin SI/HI.     Treatment Plan:   Discussed risks, benefits, and alternatives of medication. Encouraged healthy habits (eating, exercise and sleep). Call if any questions or problems arise. Medication reconciled. Controlled substance monitoring report reviewed. Provided  psychoeducation.. Discussed coping strategies and current stressors. Set appropriate boundaries and limits for patient's well-being. Use distraction techniques to improve symptoms. Access support networks.      No follow-ups on file.    Aviva Hallman, APRN

## 2022-03-14 PROCEDURE — U0004 COV-19 TEST NON-CDC HGH THRU: HCPCS | Performed by: NURSE PRACTITIONER

## 2022-03-23 ENCOUNTER — OFFICE VISIT (OUTPATIENT)
Dept: INTERNAL MEDICINE | Facility: CLINIC | Age: 22
End: 2022-03-23

## 2022-03-23 VITALS
OXYGEN SATURATION: 97 % | DIASTOLIC BLOOD PRESSURE: 78 MMHG | WEIGHT: 236.4 LBS | HEART RATE: 103 BPM | HEIGHT: 64 IN | BODY MASS INDEX: 40.36 KG/M2 | TEMPERATURE: 98.2 F | SYSTOLIC BLOOD PRESSURE: 122 MMHG

## 2022-03-23 DIAGNOSIS — R53.83 FATIGUE, UNSPECIFIED TYPE: ICD-10-CM

## 2022-03-23 DIAGNOSIS — E55.9 VITAMIN D DEFICIENCY: ICD-10-CM

## 2022-03-23 DIAGNOSIS — R55 PRE-SYNCOPE: Primary | ICD-10-CM

## 2022-03-23 DIAGNOSIS — N92.0 MENORRHAGIA WITH REGULAR CYCLE: ICD-10-CM

## 2022-03-23 DIAGNOSIS — M35.7 HYPERMOBILITY SYNDROME: ICD-10-CM

## 2022-03-23 DIAGNOSIS — Z83.2 FAMILY HISTORY OF AUTOIMMUNE DISORDER: ICD-10-CM

## 2022-03-23 DIAGNOSIS — E53.8 B12 DEFICIENCY: ICD-10-CM

## 2022-03-23 DIAGNOSIS — F43.10 PTSD (POST-TRAUMATIC STRESS DISORDER): ICD-10-CM

## 2022-03-23 PROCEDURE — 99214 OFFICE O/P EST MOD 30 MIN: CPT | Performed by: NURSE PRACTITIONER

## 2022-03-23 NOTE — PROGRESS NOTES
Office Visit      Patient Name: Hanny Carbone  : 2000   MRN: [id number]   Care Team: Patient Care Team:  Michelle Kat APRN as PCP - General (Family Medicine)    Chief Complaint  Establish Care (Off boarding [employer]), Fever (Low grade, was seen at the urgent care on 3/14/2022 neg covid test ), Loss of Consciousness (Patient expressed that she has passed out at least twice in the past year), and Autoimmune  (Patient's mother was recently diagnosed with autoimmune disorders she would like to look into this to see if she has any autoimmune issues, patient went through an episode in 2018 where she was very fatigued)    Subjective     Subjective      Hanny Carbone presents to [facility] for autoimmune concerns and syncope.    Establishing care with me as a previous patient of Alexsandra with several medical complaints.   Has had two episodes of syncope with loss of consciousness since January. She was evaluated in the emergency room for both of these episodes. 1st time cut her thumb and happened immediately after she saw her own blood. States she had a brief loss of consciousness and woke up on the floor. Her roommate was present and didn't notice any seizure like activity. ER work up was unremarkable. Second episode was under a lot of stress as she had just visited her Father and had to see her brothers, states she has PTSD from issues with her brothers for which she is in counseling and see's psych for medications. Currently taking cymbalta for her mood and ritalin for ADHD. Feels like mood is pretty well controlled but does get worse in certain situations such as being around her triggers- this includes her siblings. She is unsure if she actually lost consciousness during this episode but did feel extremely dizzy and had feeling as though she was going to pass out. Denies any suicidal thoughts.   She also has concerns she may have an autoimmune disorder. She is not  close to her mother but just found out mom has several autoimmune disorders and would like tested. She recalls in 2018 she had a period of time where she was extremely fatigued and had no energy to keep going. Her vitamin D has been low in the past and admits she was not compliant with treatmetn. She also had an episodes where she lost function of the right hand and basically forgot how to write with a pencil. It took months of occupational therapy to regain this skill. She states her hands, elbows, and shoulders are very hypermobile and has never been worked up for a hypermobility disorder.   Denies obvious rash (thought she may have a butterfly rash the other day but photo not consistent with this), joint pain, joint swelling, erythema of joints, changes in bowel habits, or unexpected weight loss.   She continues to feel fatigued and feels as though she sleeps OK. States she has also had several episodes of low grade fever, never higher than 99.8.       Review of Systems   Constitutional: Positive for fatigue. Negative for appetite change and fever.   HENT: Negative for congestion, sore throat and trouble swallowing.    Eyes: Negative for blurred vision and visual disturbance.   Respiratory: Negative for cough, shortness of breath and wheezing.    Cardiovascular: Negative for chest pain, palpitations and leg swelling.   Gastrointestinal: Positive for diarrhea. Negative for abdominal pain, blood in stool, constipation and nausea.   Endocrine: Negative for polydipsia, polyphagia and polyuria.   Genitourinary: Negative for dysuria.   Musculoskeletal: Positive for arthralgias. Negative for back pain, joint swelling and myalgias.   Skin: Positive for rash.   Neurological: Positive for syncope (pre-syncope). Negative for dizziness, weakness, light-headedness and headache.   Psychiatric/Behavioral: Positive for decreased concentration, sleep disturbance and stress. Negative for behavioral problems and depressed mood. The  "patient is nervous/anxious.        Objective     Objective   Vital Signs:   /78   Pulse 103   Temp 98.2 °F (36.8 °C) (Temporal)   Ht 162.6 cm (64\")   Wt 107 kg (236 lb 6.4 oz)   SpO2 97%   BMI 40.58 kg/m²     Physical Exam  Vitals and nursing note reviewed.   Constitutional:       General: She is not in acute distress.     Appearance: Normal appearance. She is obese. She is not ill-appearing or toxic-appearing.   Eyes:      Pupils: Pupils are equal, round, and reactive to light.   Neck:      Vascular: No carotid bruit.   Cardiovascular:      Rate and Rhythm: Normal rate and regular rhythm.      Heart sounds: Normal heart sounds. No murmur heard.  Pulmonary:      Effort: Pulmonary effort is normal. No respiratory distress.      Breath sounds: Normal breath sounds. No wheezing.   Abdominal:      General: Bowel sounds are normal. There is no distension.      Palpations: Abdomen is soft.      Tenderness: There is no abdominal tenderness.   Musculoskeletal:         General: Deformity (hypermobility of fingers and elbows) present. No tenderness. Normal range of motion.      Cervical back: Neck supple. No tenderness.   Skin:     General: Skin is warm and dry.      Findings: No rash.   Neurological:      General: No focal deficit present.      Mental Status: She is alert.      Deep Tendon Reflexes: Reflexes abnormal.   Psychiatric:         Mood and Affect: Mood normal.         Behavior: Behavior normal.          Assessment / Plan      Assessment/Plan   Problem List Items Addressed This Visit        Endocrine and Metabolic    B12 deficiency    Relevant Orders    CBC No Differential    Comprehensive metabolic panel    TSH Rfx On Abnormal To Free T4    Hemoglobin A1c    Lipid panel    Insulin, Free & Total, Serum    Vitamin D 25 hydroxy    Iron and TIBC    Ferritin    ERIC With / DsDNA, RNP, Sjogrens A / B, Keen    Update labs today and escalate treatment as indicated.     Vitamin D deficiency    Relevant Orders    " CBC No Differential    Comprehensive metabolic panel    TSH Rfx On Abnormal To Free T4    Hemoglobin A1c    Lipid panel    Insulin, Free & Total, Serum    Vitamin D 25 hydroxy    Iron and TIBC    Ferritin    ERIC With / DsDNA, RNP, Sjogrens A / B, Keen    Update labs today and escalate treatment as needed. Recommend at least 15 minutes of sun exposure on the arms and face daily.        Genitourinary and Reproductive     Menorrhagia with regular cycle    Relevant Orders    CBC No Differential    Comprehensive metabolic panel    TSH Rfx On Abnormal To Free T4    Hemoglobin A1c    Lipid panel    Insulin, Free & Total, Serum    Vitamin D 25 hydroxy    Iron and TIBC    Ferritin    ERIC With / DsDNA, RNP, Sjogrens A / B, Keen    Update labs as above today. Recommend starting to take ibuprofen 1 week prior to menses. Needs a pap smear and she will return for physical with pap. Escalate treatment as indicated.       Other Visit Diagnoses     Pre-syncope    -  Primary    Relevant Orders    CBC No Differential    Comprehensive metabolic panel    TSH Rfx On Abnormal To Free T4    Hemoglobin A1c    Lipid panel    Insulin, Free & Total, Serum    Vitamin D 25 hydroxy    Iron and TIBC    Ferritin    ERIC With / DsDNA, RNP, Sjogrens A / B, Keen    Symptoms consistent with vasovagal response. Workup in ER was negative. Recommend staying hydrated with clear decaffeinated fluids, move slowly from sitting to standing, sit down with any dizziness, will replace iron as needed, and try to identify triggers. If no improvement will refer as appropriate.     Fatigue, unspecified type        Relevant Orders    CBC No Differential    Comprehensive metabolic panel    TSH Rfx On Abnormal To Free T4    Hemoglobin A1c    Lipid panel    Insulin, Free & Total, Serum    Vitamin D 25 hydroxy    Iron and TIBC    Ferritin    ERIC With / DsDNA, RNP, Sjogrens A / B, Keen    Labs as above to rule out underlying causes. Escalate treatment and workup as  needed. Recommend sleep hygiene measures, healthy diet, exercise as tolerated, and stress management.     Family history of autoimmune disorder        Relevant Orders    CBC No Differential    Comprehensive metabolic panel    TSH Rfx On Abnormal To Free T4    Hemoglobin A1c    Lipid panel    Insulin, Free & Total, Serum    Vitamin D 25 hydroxy    Iron and TIBC    Ferritin    ERIC With / DsDNA, RNP, Sjogrens A / B, Keen    PTSD (post-traumatic stress disorder)        Relevant Orders    CBC No Differential    Comprehensive metabolic panel    TSH Rfx On Abnormal To Free T4    Hemoglobin A1c    Lipid panel    Insulin, Free & Total, Serum    Vitamin D 25 hydroxy    Iron and TIBC    Ferritin    ERIC With / DsDNA, RNP, Sjogrens A / B, Keen    Managed by psych, keep follow-up and continue current medication regimen and counseling.     Hypermobility syndrome        Relevant Orders    CBC No Differential    Comprehensive metabolic panel    TSH Rfx On Abnormal To Free T4    Hemoglobin A1c    Lipid panel    Insulin, Free & Total, Serum    Vitamin D 25 hydroxy    Iron and TIBC    Ferritin    ERIC With / DsDNA, RNP, Sjogrens A / B, Keen    Due to concerns for hypermobility syndrome and symptoms will refer to rheumatology for further workup. Will start initial labs today as above. See above recommendations. She will return in 6 weeks for her physical with pap.          Follow Up   Return in about 6 weeks (around 5/4/2022) for Annual with pap smear.  Patient was given instructions and counseling regarding her condition or for health maintenance advice. Please see specific information pulled into the AVS if appropriate.     SEBLE Ramires  [facility]

## 2022-03-29 DIAGNOSIS — E55.9 VITAMIN D DEFICIENCY: Primary | ICD-10-CM

## 2022-03-29 RX ORDER — ERGOCALCIFEROL 1.25 MG/1
50000 CAPSULE ORAL WEEKLY
Qty: 6 CAPSULE | Refills: 0 | Status: SHIPPED | OUTPATIENT
Start: 2022-03-29

## 2022-04-01 DIAGNOSIS — R11.0 NAUSEA: ICD-10-CM

## 2022-04-01 LAB
25(OH)D3+25(OH)D2 SERPL-MCNC: 16.9 NG/ML (ref 30–100)
ALBUMIN SERPL-MCNC: 4.7 G/DL (ref 3.5–5.2)
ALBUMIN/GLOB SERPL: 2 G/DL
ALP SERPL-CCNC: 59 U/L (ref 39–117)
ALT SERPL-CCNC: 9 U/L (ref 1–33)
ANA SER QL: NEGATIVE
AST SERPL-CCNC: 13 U/L (ref 1–32)
BILIRUB SERPL-MCNC: 0.3 MG/DL (ref 0–1.2)
BUN SERPL-MCNC: 13 MG/DL (ref 6–20)
BUN/CREAT SERPL: 19.4 (ref 7–25)
CALCIUM SERPL-MCNC: 9.5 MG/DL (ref 8.6–10.5)
CHLORIDE SERPL-SCNC: 103 MMOL/L (ref 98–107)
CHOLEST SERPL-MCNC: 186 MG/DL (ref 0–200)
CO2 SERPL-SCNC: 23.7 MMOL/L (ref 22–29)
CREAT SERPL-MCNC: 0.67 MG/DL (ref 0.57–1)
EGFRCR SERPLBLD CKD-EPI 2021: 126.9 ML/MIN/1.73
ERYTHROCYTE [DISTWIDTH] IN BLOOD BY AUTOMATED COUNT: 15.2 % (ref 12.3–15.4)
FERRITIN SERPL-MCNC: 16.9 NG/ML (ref 13–150)
GLOBULIN SER CALC-MCNC: 2.3 GM/DL
GLUCOSE SERPL-MCNC: 85 MG/DL (ref 65–99)
HBA1C MFR BLD: 5.1 % (ref 4.8–5.6)
HCT VFR BLD AUTO: 41.6 % (ref 34–46.6)
HDLC SERPL-MCNC: 53 MG/DL (ref 40–60)
HGB BLD-MCNC: 13.3 G/DL (ref 12–15.9)
INSULIN FREE SERPL-ACNC: 6 UU/ML
INSULIN SERPL-ACNC: 6 UU/ML
IRON SATN MFR SERPL: 19 % (ref 20–50)
IRON SERPL-MCNC: 91 MCG/DL (ref 37–145)
LDLC SERPL CALC-MCNC: 123 MG/DL (ref 0–100)
MCH RBC QN AUTO: 26.9 PG (ref 26.6–33)
MCHC RBC AUTO-ENTMCNC: 32 G/DL (ref 31.5–35.7)
MCV RBC AUTO: 84.2 FL (ref 79–97)
PLATELET # BLD AUTO: 270 10*3/MM3 (ref 140–450)
POTASSIUM SERPL-SCNC: 4.3 MMOL/L (ref 3.5–5.2)
PROT SERPL-MCNC: 7 G/DL (ref 6–8.5)
RBC # BLD AUTO: 4.94 10*6/MM3 (ref 3.77–5.28)
SODIUM SERPL-SCNC: 143 MMOL/L (ref 136–145)
TIBC SERPL-MCNC: 467 MCG/DL
TRIGL SERPL-MCNC: 54 MG/DL (ref 0–150)
TSH SERPL DL<=0.005 MIU/L-ACNC: 1.18 UIU/ML (ref 0.27–4.2)
UIBC SERPL-MCNC: 376 MCG/DL (ref 112–346)
VLDLC SERPL CALC-MCNC: 10 MG/DL (ref 5–40)
WBC # BLD AUTO: 6.5 10*3/MM3 (ref 3.4–10.8)

## 2022-04-01 RX ORDER — ONDANSETRON 4 MG/1
4 TABLET, ORALLY DISINTEGRATING ORAL EVERY 6 HOURS PRN
Qty: 10 TABLET | Refills: 0 | Status: SHIPPED | OUTPATIENT
Start: 2022-04-01

## 2022-04-21 ENCOUNTER — OFFICE VISIT (OUTPATIENT)
Dept: BEHAVIORAL HEALTH | Facility: CLINIC | Age: 22
End: 2022-04-21

## 2022-04-21 VITALS
WEIGHT: 241 LBS | BODY MASS INDEX: 41.15 KG/M2 | HEIGHT: 64 IN | DIASTOLIC BLOOD PRESSURE: 70 MMHG | SYSTOLIC BLOOD PRESSURE: 110 MMHG

## 2022-04-21 DIAGNOSIS — F90.2 ADHD (ATTENTION DEFICIT HYPERACTIVITY DISORDER), COMBINED TYPE: Primary | ICD-10-CM

## 2022-04-21 DIAGNOSIS — F41.9 ANXIETY: ICD-10-CM

## 2022-04-21 DIAGNOSIS — F33.1 MODERATE EPISODE OF RECURRENT MAJOR DEPRESSIVE DISORDER: ICD-10-CM

## 2022-04-21 PROCEDURE — 99213 OFFICE O/P EST LOW 20 MIN: CPT | Performed by: NURSE PRACTITIONER

## 2022-04-21 RX ORDER — DESVENLAFAXINE SUCCINATE 50 MG/1
50 TABLET, EXTENDED RELEASE ORAL DAILY
Qty: 30 TABLET | Refills: 3 | Status: SHIPPED | OUTPATIENT
Start: 2022-04-21 | End: 2022-04-26 | Stop reason: CLARIF

## 2022-04-21 RX ORDER — DEXTROAMPHETAMINE SACCHARATE, AMPHETAMINE ASPARTATE, DEXTROAMPHETAMINE SULFATE AND AMPHETAMINE SULFATE 2.5; 2.5; 2.5; 2.5 MG/1; MG/1; MG/1; MG/1
TABLET ORAL
Qty: 60 TABLET | Refills: 0 | Status: SHIPPED | OUTPATIENT
Start: 2022-04-21 | End: 2022-10-27 | Stop reason: DRUGHIGH

## 2022-04-21 NOTE — PROGRESS NOTES
Patient Name: Hanny Carbone  MRN: [id number]   :  2000     Chief Complaint:      ICD-10-CM ICD-9-CM   1. ADHD (attention deficit hyperactivity disorder), combined type  F90.2 314.01   2. Moderate episode of recurrent major depressive disorder (HCC)  F33.1 296.32   3. Anxiety  F41.9 300.00       History of Present Illness: Hanny Carbone is a 21 y.o. female is here today for medication management follow up.  Patient states her anxiety has been awful and cannot figure out why.  Overthinking a lot.  Trouble staying awake.  Sleeping through alarms.  Takes Ritalin sometimes however makes her very agitated and makes her skin crawl and on edge.  Tends to pick at her skin more when she is anxious.    The following portions of the patient's history were reviewed and updated as appropriate: allergies, current medications, past family history, past medical history, past social history, past surgical history and problem list.    Review of Systems;;  Review of Systems   Constitutional: Negative for activity change, appetite change, fatigue, unexpected weight gain and unexpected weight loss.   Respiratory: Negative for shortness of breath and wheezing.    Gastrointestinal: Negative for constipation, diarrhea, nausea and vomiting.   Musculoskeletal: Negative for gait problem.   Skin: Negative for dry skin and rash.   Neurological: Negative for dizziness, speech difficulty, weakness, light-headedness, headache, memory problem and confusion.   Psychiatric/Behavioral: Positive for decreased concentration. Negative for agitation, behavioral problems, dysphoric mood, hallucinations, self-injury, sleep disturbance, suicidal ideas, negative for hyperactivity, depressed mood and stress. The patient is nervous/anxious.        Physical Exam;;  Physical Exam  Vitals and nursing note reviewed.   Constitutional:       General: She is not in acute distress.     Appearance: She is well-developed. She is not diaphoretic.   HENT:      Head:  "Normocephalic and atraumatic.   Eyes:      Conjunctiva/sclera: Conjunctivae normal.   Cardiovascular:      Rate and Rhythm: Normal rate.   Pulmonary:      Effort: Pulmonary effort is normal. No respiratory distress.   Musculoskeletal:         General: Normal range of motion.      Cervical back: Full passive range of motion without pain and normal range of motion.   Skin:     General: Skin is warm and dry.   Neurological:      Mental Status: She is alert and oriented to person, place, and time.   Psychiatric:         Mood and Affect: Mood is anxious. Mood is not depressed. Affect is not labile, blunt, angry or inappropriate.         Speech: Speech is not rapid and pressured or tangential.         Behavior: Behavior normal. Behavior is not agitated, slowed, aggressive, withdrawn, hyperactive or combative. Behavior is cooperative.         Thought Content: Thought content normal. Thought content is not paranoid or delusional. Thought content does not include homicidal or suicidal ideation. Thought content does not include homicidal or suicidal plan.         Judgment: Judgment normal.       Blood pressure 110/70, height 162.6 cm (64\"), weight 109 kg (241 lb).  Body mass index is 41.37 kg/m².    Current Medications;;    Current Outpatient Medications:   •  fexofenadine (ALLEGRA) 60 MG tablet, Take 60 mg by mouth Daily., Disp: , Rfl:   •  ibuprofen (ADVIL,MOTRIN) 200 MG tablet, Take 200 mg by mouth Every 6 (Six) Hours As Needed for Mild Pain ., Disp: , Rfl:   •  ondansetron ODT (Zofran ODT) 4 MG disintegrating tablet, Place 1 tablet on the tongue Every 6 (Six) Hours As Needed for Nausea., Disp: 10 tablet, Rfl: 0  •  vitamin D (ERGOCALCIFEROL) 1.25 MG (22219 UT) capsule capsule, Take 1 capsule by mouth 1 (One) Time Per Week., Disp: 6 capsule, Rfl: 0  •  amphetamine-dextroamphetamine (Adderall) 10 MG tablet, Take 10 mg orally every morning and afternoon., Disp: 60 tablet, Rfl: 0  •  Pristiq 50 MG 24 hr tablet, Take 1 tablet " by mouth Daily., Disp: 30 tablet, Rfl: 3    Lab Results:   Office Visit on 03/23/2022   Component Date Value Ref Range Status   • WBC 03/25/2022 6.50  3.40 - 10.80 10*3/mm3 Final   • RBC 03/25/2022 4.94  3.77 - 5.28 10*6/mm3 Final   • Hemoglobin 03/25/2022 13.3  12.0 - 15.9 g/dL Final   • Hematocrit 03/25/2022 41.6  34.0 - 46.6 % Final   • MCV 03/25/2022 84.2  79.0 - 97.0 fL Final   • MCH 03/25/2022 26.9  26.6 - 33.0 pg Final   • MCHC 03/25/2022 32.0  31.5 - 35.7 g/dL Final   • RDW 03/25/2022 15.2  12.3 - 15.4 % Final   • Platelets 03/25/2022 270  140 - 450 10*3/mm3 Final   • Glucose 03/25/2022 85  65 - 99 mg/dL Final   • BUN 03/25/2022 13  6 - 20 mg/dL Final   • Creatinine 03/25/2022 0.67  0.57 - 1.00 mg/dL Final   • EGFR Result 03/25/2022 126.9  >60.0 mL/min/1.73 Final    Comment: National Kidney Foundation and American Society of  Nephrology (ASN) Task Force recommended calculation based  on the Chronic Kidney Disease Epidemiology Collaboration  (CKD-EPI) equation refit without adjustment for race.  GFR Normal >60  Chronic Kidney Disease <60  Kidney Failure <15     • BUN/Creatinine Ratio 03/25/2022 19.4  7.0 - 25.0 Final   • Sodium 03/25/2022 143  136 - 145 mmol/L Final   • Potassium 03/25/2022 4.3  3.5 - 5.2 mmol/L Final   • Chloride 03/25/2022 103  98 - 107 mmol/L Final   • Total CO2 03/25/2022 23.7  22.0 - 29.0 mmol/L Final   • Calcium 03/25/2022 9.5  8.6 - 10.5 mg/dL Final   • Total Protein 03/25/2022 7.0  6.0 - 8.5 g/dL Final   • Albumin 03/25/2022 4.70  3.50 - 5.20 g/dL Final   • Globulin 03/25/2022 2.3  gm/dL Final   • A/G Ratio 03/25/2022 2.0  g/dL Final   • Total Bilirubin 03/25/2022 0.3  0.0 - 1.2 mg/dL Final   • Alkaline Phosphatase 03/25/2022 59  39 - 117 U/L Final   • AST (SGOT) 03/25/2022 13  1 - 32 U/L Final   • ALT (SGPT) 03/25/2022 9  1 - 33 U/L Final   • TSH 03/25/2022 1.180  0.270 - 4.200 uIU/mL Final   • Hemoglobin A1C 03/25/2022 5.10  4.80 - 5.60 % Final    Comment: Hemoglobin A1C  Ranges:  Increased Risk for Diabetes  5.7% to 6.4%  Diabetes                     >= 6.5%  Diabetic Goal                < 7.0%     • Total Cholesterol 03/25/2022 186  0 - 200 mg/dL Final    Comment: Cholesterol Reference Ranges  (U.S. Department of Health and Human Services ATP III  Classifications)  Desirable          <200 mg/dL  Borderline High    200-239 mg/dL  High Risk          >240 mg/dL  Triglyceride Reference Ranges  (U.S. Department of Health and Human Services ATP III  Classifications)  Normal           <150 mg/dL  Borderline High  150-199 mg/dL  High             200-499 mg/dL  Very High        >500 mg/dL  HDL Reference Ranges  (U.S. Department of Health and Human Services ATP III  Classifications)  Low     <40 mg/dl (major risk factor for CHD)  High    >60 mg/dl ('negative' risk factor for CHD)  LDL Reference Ranges  (U.S. Department of Health and Human Services ATP III  Classifications)  Optimal          <100 mg/dL  Near Optimal     100-129 mg/dL  Borderline High  130-159 mg/dL  High             160-189 mg/dL  Very High        >189 mg/dL     • Triglycerides 03/25/2022 54  0 - 150 mg/dL Final   • HDL Cholesterol 03/25/2022 53  40 - 60 mg/dL Final   • VLDL Cholesterol Juan 03/25/2022 10  5 - 40 mg/dL Final   • LDL Chol Calc ([facility]) 03/25/2022 123 (A) 0 - 100 mg/dL Final   • Insulin, Free 03/25/2022 6.0  uU/mL Final    Comment: Reference Range:  Pubertal Children and  Adults (fasting): 0 - 17     • Insulin 03/25/2022 6.0  uU/mL Final    Comment: Non-Diabetic:  In the absence of insulin-binding antibodies,  the free and total insulin assays are equivalent. However,  this assay is intended for use in diabetics with insulin  autoantibody present. Measurement is performed on  acid-treated samples and, therefore, the sensitivity and  absolute values by this method may differ from our direct  insulin ICMA.  Insulin Dependent Diabetic Patients:  Free Insulin levels  vary depending on the capacity and affinity of  circulating  insulin-binding antibodies and the dose of insulin given to  the patient.  Total insulin levels represent free insulin  and antibody bound insulin fractions.  This test was developed and its performance characteristics  determined by [facility]. It has not been cleared or approved  by the Food and Drug Administration.     • 25 Hydroxy, Vitamin D 03/25/2022 16.9 (A) 30.0 - 100.0 ng/ml Final    Comment: Results may be falsely increased if patient taking Biotin.  Reference Range for Total Vitamin D 25(OH)  Deficiency <20.0 ng/mL  Insufficiency 21-29 ng/mL  Sufficiency  ng/mL  Toxicity >100 ng/ml     • TIBC 03/25/2022 467  mcg/dL Final   • UIBC 03/25/2022 376 (A) 112 - 346 mcg/dL Final   • Iron 03/25/2022 91  37 - 145 mcg/dL Final   • Iron Saturation 03/25/2022 19 (A) 20 - 50 % Final   • Ferritin 03/25/2022 16.90  13.00 - 150.00 ng/mL Final    Results may be falsely decreased if patient taking Biotin.   • ERIC Direct 03/25/2022 Negative  Negative Final   Admission on 03/14/2022, Discharged on 03/14/2022   Component Date Value Ref Range Status   • SARS-CoV-2 AICHA 03/14/2022 Not Detected  Not Detected Final   • Rapid Influenza A Ag 03/14/2022 Negative  Negative Final   • Rapid Influenza B Ag 03/14/2022 Negative  Negative Final   • Internal Control 03/14/2022 Passed  Passed Final   • Lot Number 03/14/2022 [id number]   Final   • Expiration Date 03/14/2022 8/31/22   Final       Mental Status Exam:   Hygiene:   good  Cooperation:  Cooperative  Eye Contact:  Good  Psychomotor Behavior:  Appropriate  Mood:anxious  Affect:  Appropriate  Hopelessness: Denies  Speech:  Normal  Thought Process:  Goal directed  Thought Content:  Normal  Suicidal:  None  Homicidal:  None  Hallucinations:  None  Delusion:  None  Memory:  Intact  Orientation:  Person, Place, Time and Situation  Reliability:  good  Insight:  Good  Judgement:  Good  Impulse Control:  Good  Physical/Medical Issues:  No     PHQ-9 Depression Screening  Little  interest or pleasure in doing things? 2-->more than half the days   Feeling down, depressed, or hopeless? 2-->more than half the days   Trouble falling or staying asleep, or sleeping too much? 2-->more than half the days   Feeling tired or having little energy? 2-->more than half the days   Poor appetite or overeating? 2-->more than half the days   Feeling bad about yourself - or that you are a failure or have let yourself or your family down? 3-->nearly every day   Trouble concentrating on things, such as reading the newspaper or watching television? 1-->several days   Moving or speaking so slowly that other people could have noticed? Or the opposite - being so fidgety or restless that you have been moving around a lot more than usual? 2-->more than half the days   Thoughts that you would be better off dead, or of hurting yourself in some way? 1-->several days   PHQ-9 Total Score 17   If you checked off any problems, how difficult have these problems made it for you to do your work, take care of things at home, or get along with other people? somewhat difficult        Assessment/Plan:  Diagnoses and all orders for this visit:    1. ADHD (attention deficit hyperactivity disorder), combined type (Primary)  -     amphetamine-dextroamphetamine (Adderall) 10 MG tablet; Take 10 mg orally every morning and afternoon.  Dispense: 60 tablet; Refill: 0    2. Moderate episode of recurrent major depressive disorder (HCC)  -     Pristiq 50 MG 24 hr tablet; Take 1 tablet by mouth Daily.  Dispense: 30 tablet; Refill: 3    3. Anxiety      Wean from Cymbalta.  Start Pristiq 50 mg daily.  Discontinue Ritalin.  Start Adderall 10 mg twice a day.    A psychological evaluation was conducted in order to assess past and current level of functioning. Areas assessed included, but were not limited to: perception of social support, perception of ability to face and deal with challenges in life (positive functioning), anxiety symptoms,  depressive symptoms, perspective on beliefs/belief system, coping skills for stress, intelligence level,  Therapeutic rapport was established. Interventions conducted today were geared towards incorporating medication management along with support for continued therapy. Education was also provided as to the med management with this provider and what to expect in subsequent sessions.    We discussed risks, benefits,goals and side effects of the above medication and the patient was agreeable with the plan.Patient was educated on the importance of compliance with treatment and follow-up appointments. Patient is aware to contact the [facility] with any worsening of symptoms. To call for questions or concerns and return early if necessary. Patent is agreeable to go to the Emergency Department or call 911 should they begin SI/HI.     Treatment Plan:   Discussed risks, benefits, and alternatives of medication. Encouraged healthy habits (eating, exercise and sleep). Call if any questions or problems arise. Medication reconciled. Controlled substance monitoring report reviewed. Provided psychoeducation.. Discussed coping strategies and current stressors. Set appropriate boundaries and limits for patient's well-being. Use distraction techniques to improve symptoms. Access support networks.      Return in about 2 months (around 6/21/2022) for Follow Up 30 min.    SEBLE Agustin

## 2022-04-26 RX ORDER — DESVENLAFAXINE SUCCINATE 50 MG/1
50 TABLET, EXTENDED RELEASE ORAL DAILY
Qty: 30 TABLET | Refills: 3 | Status: SHIPPED | OUTPATIENT
Start: 2022-04-26 | End: 2022-07-28 | Stop reason: SDUPTHER

## 2022-05-04 ENCOUNTER — OFFICE VISIT (OUTPATIENT)
Dept: INTERNAL MEDICINE | Facility: CLINIC | Age: 22
End: 2022-05-04

## 2022-05-04 VITALS
TEMPERATURE: 98.2 F | WEIGHT: 234.8 LBS | DIASTOLIC BLOOD PRESSURE: 80 MMHG | SYSTOLIC BLOOD PRESSURE: 120 MMHG | HEART RATE: 91 BPM | OXYGEN SATURATION: 99 % | HEIGHT: 64 IN | BODY MASS INDEX: 40.08 KG/M2

## 2022-05-04 DIAGNOSIS — F90.2 ADHD (ATTENTION DEFICIT HYPERACTIVITY DISORDER), COMBINED TYPE: ICD-10-CM

## 2022-05-04 DIAGNOSIS — F41.8 DEPRESSION WITH ANXIETY: ICD-10-CM

## 2022-05-04 DIAGNOSIS — E66.01 CLASS 3 SEVERE OBESITY DUE TO EXCESS CALORIES WITHOUT SERIOUS COMORBIDITY WITH BODY MASS INDEX (BMI) OF 40.0 TO 44.9 IN ADULT: ICD-10-CM

## 2022-05-04 DIAGNOSIS — Z00.00 ENCOUNTER FOR ANNUAL PHYSICAL EXAM: Primary | ICD-10-CM

## 2022-05-04 DIAGNOSIS — M35.7 HYPERMOBILITY SYNDROME: ICD-10-CM

## 2022-05-04 DIAGNOSIS — Z12.4 ENCOUNTER FOR PAPANICOLAOU SMEAR FOR CERVICAL CANCER SCREENING: ICD-10-CM

## 2022-05-04 DIAGNOSIS — N92.0 MENORRHAGIA WITH REGULAR CYCLE: ICD-10-CM

## 2022-05-04 PROCEDURE — 99395 PREV VISIT EST AGE 18-39: CPT | Performed by: NURSE PRACTITIONER

## 2022-05-04 PROCEDURE — 2014F MENTAL STATUS ASSESS: CPT | Performed by: NURSE PRACTITIONER

## 2022-05-04 PROCEDURE — 3008F BODY MASS INDEX DOCD: CPT | Performed by: NURSE PRACTITIONER

## 2022-05-04 NOTE — PROGRESS NOTES
"    Subjective   Hanny Carbone is a 21 y.o. female and is here for a comprehensive physical exam. The patient reports no problems.    HPI: here today for annual physical exam and pap smear. No acute complaints today.   Labs last visit revealed low vitamin D, she is on her last week of weekly supplement and this has improved her fatigue.   Struggles with depression, anxiety, and ADHD- seeing behavioral health who is currently adjusting her medications. She denies any suicidal or homicidal thoughts.   She reports being sexually active with male partners but not currently.     Health Habits:  Eye exam within last 2 years? Yes.   Dental exam every 6 months? No, will schedule.   Exercise habits: tries to stay active with hiking, she also waits tables, no structured exercise.   Healthy diet? Tries to eat plant based diet, doesn't totally exclude meat new endeavor for her.      The ASCVD Risk score ([place] ESTELLA Gay., et al., 2013) failed to calculate for the following reasons:    The 2013 ASCVD risk score is only valid for ages 40 to 79    Do you take any herbs or supplements that were not prescribed by a doctor? yes- vitamin d  Are you taking calcium supplements? No  Are you taking aspirin daily? No     History:  LMP: Patient's last menstrual period was 04/29/2022.  Menopause: No  Last pap date: Never  Abnormal pap? N/A  Family history of breast or ovarian cancer: unknown- grandmothers sisters all have \"breast problems\"     OB History   No obstetric history on file.      reports never being sexually active.    The following portions of the patient's history were reviewed and updated as appropriate: She  has a past medical history of Anemia, Anxiety, Depression, IBS (irritable bowel syndrome), and Scoliosis.  She does not have any pertinent problems on file.  She  has no past surgical history on file.  Her family history includes Diabetes in an other family member; Fibromyalgia in her mother; Lupus in her mother; Mental " illness in an other family member; Migraines in an other family member; Obesity in an other family member; Osteosarcoma in her brother; Sjogren's syndrome in her mother.  She  reports that she quit smoking about 2 years ago. She has a 0.13 pack-year smoking history. She has never used smokeless tobacco. She reports current alcohol use. She reports current drug use. Drug: Marijuana.  Current Outpatient Medications   Medication Sig Dispense Refill   • amphetamine-dextroamphetamine (Adderall) 10 MG tablet Take 10 mg orally every morning and afternoon. 60 tablet 0   • desvenlafaxine (PRISTIQ) 50 MG 24 hr tablet Take 1 tablet by mouth Daily. INSURANCE PREFERS GENERIC 30 tablet 3   • fexofenadine (ALLEGRA) 60 MG tablet Take 60 mg by mouth Daily.     • ibuprofen (ADVIL,MOTRIN) 200 MG tablet Take 200 mg by mouth Every 6 (Six) Hours As Needed for Mild Pain .     • ondansetron ODT (Zofran ODT) 4 MG disintegrating tablet Place 1 tablet on the tongue Every 6 (Six) Hours As Needed for Nausea. 10 tablet 0   • vitamin D (ERGOCALCIFEROL) 1.25 MG (02563 UT) capsule capsule Take 1 capsule by mouth 1 (One) Time Per Week. 6 capsule 0     No current facility-administered medications for this visit.       Review of Systems  Do you have pain that bothers you in your daily life? yes    Review of Systems   Constitutional: Positive for fatigue. Negative for appetite change and fever.   HENT: Negative for congestion, sore throat and trouble swallowing.    Eyes: Negative for blurred vision and visual disturbance.   Respiratory: Negative for cough, shortness of breath and wheezing.    Cardiovascular: Negative for chest pain, palpitations and leg swelling.   Gastrointestinal: Negative for abdominal pain, blood in stool, constipation, diarrhea and nausea.   Genitourinary: Negative for dysuria.   Musculoskeletal: Positive for arthralgias. Negative for back pain and myalgias.   Skin: Negative for rash.   Neurological: Negative for dizziness,  "weakness, light-headedness and headache.   Psychiatric/Behavioral: Positive for decreased concentration and depressed mood. Negative for sleep disturbance and suicidal ideas. The patient is nervous/anxious.          Objective   /80   Pulse 91   Temp 98.2 °F (36.8 °C) (Temporal)   Ht 162.6 cm (64\")   Wt 107 kg (234 lb 12.8 oz)   LMP 04/29/2022   SpO2 99%   BMI 40.30 kg/m²     Physical Exam  Vitals and nursing note reviewed. Exam conducted with a chaperone present (Denise Figueroa [facility]).   Constitutional:       General: She is not in acute distress.     Appearance: Normal appearance. She is obese. She is not ill-appearing.   HENT:      Right Ear: Tympanic membrane and ear canal normal.      Left Ear: Tympanic membrane and ear canal normal.      Nose: Nose normal.      Mouth/Throat:      Mouth: Mucous membranes are moist.      Pharynx: Oropharynx is clear. No posterior oropharyngeal erythema.   Eyes:      Extraocular Movements: Extraocular movements intact.      Pupils: Pupils are equal, round, and reactive to light.   Neck:      Thyroid: No thyroid mass or thyromegaly.      Vascular: No carotid bruit.   Cardiovascular:      Rate and Rhythm: Normal rate and regular rhythm.      Pulses: Normal pulses.      Heart sounds: Normal heart sounds. No murmur heard.  Pulmonary:      Effort: Pulmonary effort is normal.      Breath sounds: Normal breath sounds. No wheezing.   Chest:      Chest wall: No mass or tenderness.   Breasts:      Andriy Score is 5.      Right: Normal. No bleeding, inverted nipple, nipple discharge, skin change, axillary adenopathy or supraclavicular adenopathy.      Left: Normal. No bleeding, inverted nipple, nipple discharge, skin change, axillary adenopathy or supraclavicular adenopathy.       Abdominal:      General: Bowel sounds are normal. There is no distension.      Palpations: Abdomen is soft. There is no mass.      Tenderness: There is no abdominal tenderness.   Genitourinary:     " "Exam position: Lithotomy position.      Pubic Area: No rash.       Andriy stage (genital): 5.      Labia:         Right: No rash or lesion.         Left: No rash or lesion.       Vagina: No vaginal discharge, tenderness, bleeding or lesions.      Cervix: Discharge (small amount of sanguinous discharge) present. No friability, lesion, erythema or cervical bleeding.      Uterus: Normal. Not enlarged.       Adnexa: Right adnexa normal and left adnexa normal.        Right: No tenderness.          Left: No tenderness.     Musculoskeletal:         General: No deformity. Normal range of motion.      Cervical back: Normal range of motion and neck supple. No muscular tenderness.   Lymphadenopathy:      Head:      Right side of head: No submandibular, tonsillar, preauricular or posterior auricular adenopathy.      Left side of head: No submandibular, tonsillar, preauricular or posterior auricular adenopathy.      Cervical: No cervical adenopathy.      Upper Body:      Right upper body: No supraclavicular, axillary or pectoral adenopathy.      Left upper body: No supraclavicular, axillary or pectoral adenopathy.   Skin:     General: Skin is warm and dry.      Capillary Refill: Capillary refill takes less than 2 seconds.      Findings: Lesion (areas of indurated skin diffuse over body from \"picking\") present. No bruising or rash.   Neurological:      Mental Status: She is alert and oriented to person, place, and time.      Gait: Gait normal.      Deep Tendon Reflexes: Reflexes normal.   Psychiatric:         Mood and Affect: Mood normal.         Behavior: Behavior normal.       Assessment/Plan   Healthy female exam.    Diagnosis Plan   1. Encounter for annual physical exam  Health maintenance discussed during visit and information provided in AVS. Recommend healthy diet, exercise as tolerated, sleep hygiene, schedule dental exam, and annual physicals. Encouraged COVID booster and HPV vaccine, politely declines.    2. Encounter " for Papanicolaou smear for cervical cancer screening  LIQUID-BASED PAP SMEAR, [facility] ([facility])   3. Menorrhagia with regular cycle  Discussed options of hormonal contraception today, she is not interested. Pap smear performed. Recommend weight loss, healthy diet low in carbohydrates and sugar, exercise as tolerated, and ibuprofen as needed.    4. Hypermobility syndrome  Keep scheduled follow-up with rheumatology.    5. Class 3 severe obesity due to excess calories without serious comorbidity with body mass index (BMI) of 40.0 to 44.9 in adult (HCC)  Class 3 Severe Obesity (BMI >=40). Obesity-related health conditions include the following: joint pain. Obesity is improving with lifestyle modifications. BMI is is above average; BMI management plan is completed. We discussed portion control and increasing exercise.     6. Depression with anxiety  Managed by psych, continue pristiq and adderal. Schedule follow-up.    7. ADHD (attention deficit hyperactivity disorder), combined type  Schedule follow-up with psych provider.        2. Patient Counseling:  --Nutrition: Stressed importance of moderation in sodium/caffeine intake, saturated fat and cholesterol, caloric balance, sufficient intake of fresh fruits, vegetables, fiber, calcium, iron, and 1 g folate supplementation if of childbearing age.   --Discussed the issue of calcium supplement, and the daily use of baby aspirin if applicable.             --Mammogram recommended every 2 years from age 40-49 and yearly beginning at age 50.  --Exercise: Stressed the importance of regular exercise.   --Substance Abuse: Discussed cessation/primary prevention of tobacco (if applicable), alcohol, or other drug use (if applicable); driving or other dangerous activities under the influence; availability of treatment for abuse.    --Sexuality: Discussed sexually transmitted diseases, partner selection, use of condoms, avoidance of unintended pregnancy  and contraceptive  alternatives.   --Injury prevention: Discussed safety belts, safety helmets, smoke detector, smoking near bedding or upholstery.   --Dental health: Discussed importance of regular tooth brushing, flossing, and dental visits every 6 months.  --Immunizations reviewed.  --After hours service discussed with patient  3. Discussed the patient's BMI with her.  The BMI is above average; BMI management plan is completed  4. Return in about 1 year (around 5/4/2023) for Annual.     Michelle Kat, SEBLE  05/04/2022  15:06 EDT

## 2022-05-31 ENCOUNTER — TELEPHONE (OUTPATIENT)
Dept: INTERNAL MEDICINE | Facility: CLINIC | Age: 22
End: 2022-05-31

## 2022-05-31 NOTE — TELEPHONE ENCOUNTER
Caller: Hanny Carbone    Relationship to patient: Self    Best call back number: [phone]    Date of exposure: 05/22/22    Date of positive COVID19 test: 05/29/22    Date if possible COVID19 exposure: 05/22/22    COVID19 symptoms: HEADACHE, COUGH, SORE THROAT, SINUS CONGESTION    Date of initial quarantine: 05/25/22    Additional information or concerns: PATIENT JUST WANTED TO LET PROVIDER KNOW THAT SHE IS POSITIVE FOR COVID.    What is the patients preferred pharmacy: [facility]    [facility]

## 2022-06-09 ENCOUNTER — OFFICE VISIT (OUTPATIENT)
Dept: BEHAVIORAL HEALTH | Facility: CLINIC | Age: 22
End: 2022-06-09

## 2022-06-09 VITALS
DIASTOLIC BLOOD PRESSURE: 72 MMHG | HEIGHT: 64 IN | WEIGHT: 234.5 LBS | SYSTOLIC BLOOD PRESSURE: 116 MMHG | BODY MASS INDEX: 40.04 KG/M2

## 2022-06-09 DIAGNOSIS — F33.1 MODERATE EPISODE OF RECURRENT MAJOR DEPRESSIVE DISORDER: ICD-10-CM

## 2022-06-09 DIAGNOSIS — F90.2 ADHD (ATTENTION DEFICIT HYPERACTIVITY DISORDER), COMBINED TYPE: Primary | ICD-10-CM

## 2022-06-09 DIAGNOSIS — F41.9 ANXIETY: ICD-10-CM

## 2022-06-09 PROCEDURE — 99213 OFFICE O/P EST LOW 20 MIN: CPT

## 2022-06-09 RX ORDER — BUSPIRONE HYDROCHLORIDE 5 MG/1
5 TABLET ORAL 2 TIMES DAILY
Qty: 60 TABLET | Refills: 0 | Status: SHIPPED | OUTPATIENT
Start: 2022-06-09 | End: 2022-07-06

## 2022-06-09 RX ORDER — HYDROXYZINE 50 MG/1
50 TABLET, FILM COATED ORAL 3 TIMES DAILY PRN
Qty: 90 TABLET | Refills: 0 | Status: SHIPPED | OUTPATIENT
Start: 2022-06-09 | End: 2022-07-06

## 2022-06-09 NOTE — PROGRESS NOTES
"  Follow Up Office Visit    Patient Name: Hanny Carbone  : 2000   MRN: [id number]   Care Team: Patient Care Team:  Michelle Kat APRN as PCP - General (Family Medicine)        Chief Complaint:    Chief Complaint   Patient presents with   • ADHD   • Depression   • Med Management   • Anxiety       History of Present Illness: Hanny Carbone is a 22 y.o. female who is here today for a medication management follow up. Patient reports that things are going \"ok\". She says that she continues to have trouble with sleep and her anxiety continues to be elevated. She does feel that the Adderall is helping her focus when she has things to focus on. She is still working with her therapist. Patient would like to eventually come off of her antidepressant but understand that this is probably not a good idea at this time. Patient does endorse noncompliance with her medication at times. Patient denies SI/HI today.     Subjective   Review of Systems:    Review of Systems   Psychiatric/Behavioral: Positive for stress.   All other systems reviewed and are negative.      Current Medications:   Current Outpatient Medications   Medication Sig Dispense Refill   • amphetamine-dextroamphetamine (Adderall) 10 MG tablet Take 10 mg orally every morning and afternoon. 60 tablet 0   • desvenlafaxine (PRISTIQ) 50 MG 24 hr tablet Take 1 tablet by mouth Daily. INSURANCE PREFERS GENERIC 30 tablet 3   • fexofenadine (ALLEGRA) 60 MG tablet Take 60 mg by mouth Daily.     • ibuprofen (ADVIL,MOTRIN) 200 MG tablet Take 200 mg by mouth Every 6 (Six) Hours As Needed for Mild Pain .     • ondansetron ODT (Zofran ODT) 4 MG disintegrating tablet Place 1 tablet on the tongue Every 6 (Six) Hours As Needed for Nausea. 10 tablet 0   • vitamin D (ERGOCALCIFEROL) 1.25 MG (04176 UT) capsule capsule Take 1 capsule by mouth 1 (One) Time Per Week. 6 capsule 0   • busPIRone (BUSPAR) 5 MG tablet Take 1 tablet by mouth 2 (Two) Times a Day. 60 tablet 0   • hydrOXYzine " "(ATARAX) 50 MG tablet Take 1 tablet by mouth 3 (Three) Times a Day As Needed (anxiety and/or sleep). 90 tablet 0     No current facility-administered medications for this visit.       Mental Status Exam:   Hygiene:   good  Cooperation:  Cooperative  Eye Contact:  Good  Psychomotor Behavior:  Hyperactive  Affect:  Appropriate  Mood: anxious  Speech:  Pressured, Rapid and Rambling  Thought Process:  Linear  Thought Content:  Mood congruent  Suicidal:  None  Homicidal:  None  Hallucinations:  None  Delusion:  None  Memory:  Intact  Orientation:  Person, Place, Time and Situation  Reliability:  good  Insight:  Fair  Judgement:  Fair  Impulse Control:  Fair  Physical/Medical Issues:  No      Objective   Vital Signs:   /72   Ht 162.6 cm (64\")   Wt 106 kg (234 lb 8 oz)   BMI 40.25 kg/m²       Assessment / Plan    Diagnoses and all orders for this visit:    1. ADHD (attention deficit hyperactivity disorder), combined type (Primary)    2. Moderate episode of recurrent major depressive disorder (HCC)    3. Anxiety  -     busPIRone (BUSPAR) 5 MG tablet; Take 1 tablet by mouth 2 (Two) Times a Day.  Dispense: 60 tablet; Refill: 0  -     hydrOXYzine (ATARAX) 50 MG tablet; Take 1 tablet by mouth 3 (Three) Times a Day As Needed (anxiety and/or sleep).  Dispense: 90 tablet; Refill: 0     Will add Buspar BID and Atarax.Encouraged patient to take medications as prescribed in order to be able to evaluate effectiveness at next visit.     PHQ-9 Score:   PHQ-9 Total Score: 14    Depression Screening:  Patient screened positive for depression based on a PHQ-9 score of 14 on 6/9/2022. Follow-up recommendations include: Suicide Risk Assessment performed.        MEDS ORDERED DURING VISIT:  New Medications Ordered This Visit   Medications   • busPIRone (BUSPAR) 5 MG tablet     Sig: Take 1 tablet by mouth 2 (Two) Times a Day.     Dispense:  60 tablet     Refill:  0   • hydrOXYzine (ATARAX) 50 MG tablet     Sig: Take 1 tablet by mouth 3 " (Three) Times a Day As Needed (anxiety and/or sleep).     Dispense:  90 tablet     Refill:  0         Follow Up   Return in about 6 weeks (around 7/21/2022).  Patient was given instructions and counseling regarding her condition or for health maintenance advice. Please see specific information pulled into the AVS if appropriate.     TREATMENT PLAN/GOALS: Continue supportive psychotherapy efforts and medications as indicated. Treatment and medication options discussed during today's visit. Patient acknowledged and verbally consented to continue with current treatment plan and was educated on the importance of compliance with treatment and follow-up appointments.    MEDICATION ISSUES:  Discussed medication options and treatment plan of prescribed medication as well as the risks, benefits, and side effects including potential falls, possible impaired driving and metabolic adversities among others. Patient is agreeable to call the office with any worsening of symptoms or onset of side effects. Patient is agreeable to call 911 or go to the nearest ER should he/she begin having SI/HI.    [facility]  [address] [address] [phone]    June 9, 2022 15:05 EDT

## 2022-06-16 ENCOUNTER — HOSPITAL ENCOUNTER (EMERGENCY)
Facility: HOSPITAL | Age: 22
Discharge: HOME | End: 2022-06-16
Attending: EMERGENCY MEDICINE | Admitting: EMERGENCY MEDICINE

## 2022-06-16 VITALS
WEIGHT: 241 LBS | SYSTOLIC BLOOD PRESSURE: 128 MMHG | HEART RATE: 74 BPM | RESPIRATION RATE: 16 BRPM | OXYGEN SATURATION: 99 % | TEMPERATURE: 98.2 F | HEIGHT: 65 IN | DIASTOLIC BLOOD PRESSURE: 78 MMHG | BODY MASS INDEX: 40.15 KG/M2

## 2022-06-16 DIAGNOSIS — J02.9 SORE THROAT: Primary | ICD-10-CM

## 2022-06-16 DIAGNOSIS — U07.1 COVID-19: ICD-10-CM

## 2022-06-16 LAB
B PARAPERT DNA SPEC QL NAA+PROBE: NOT DETECTED
B PERT DNA SPEC QL NAA+PROBE: NOT DETECTED
C PNEUM DNA NPH QL NAA+NON-PROBE: NOT DETECTED
FLUAV SUBTYP SPEC NAA+PROBE: NOT DETECTED
FLUBV RNA ISLT QL NAA+PROBE: NOT DETECTED
HADV DNA SPEC NAA+PROBE: NOT DETECTED
HCOV 229E RNA SPEC QL NAA+PROBE: NOT DETECTED
HCOV HKU1 RNA SPEC QL NAA+PROBE: NOT DETECTED
HCOV NL63 RNA SPEC QL NAA+PROBE: NOT DETECTED
HCOV OC43 RNA SPEC QL NAA+PROBE: NOT DETECTED
HMPV RNA NPH QL NAA+NON-PROBE: NOT DETECTED
HPIV1 RNA ISLT QL NAA+PROBE: NOT DETECTED
HPIV2 RNA SPEC QL NAA+PROBE: NOT DETECTED
HPIV3 RNA NPH QL NAA+PROBE: NOT DETECTED
HPIV4 P GENE NPH QL NAA+PROBE: NOT DETECTED
M PNEUMO IGG SER IA-ACNC: NOT DETECTED
RHINOVIRUS RNA SPEC NAA+PROBE: NOT DETECTED
RSV RNA NPH QL NAA+NON-PROBE: NOT DETECTED
S PYO AG THROAT QL: NEGATIVE
SARS-COV-2 RNA NPH QL NAA+NON-PROBE: DETECTED

## 2022-06-16 PROCEDURE — 87081 CULTURE SCREEN ONLY: CPT | Performed by: PHYSICIAN ASSISTANT

## 2022-06-16 PROCEDURE — 63710000001 DEXAMETHASONE PER 0.25 MG: Performed by: PHYSICIAN ASSISTANT

## 2022-06-16 PROCEDURE — 0202U NFCT DS 22 TRGT SARS-COV-2: CPT | Performed by: PHYSICIAN ASSISTANT

## 2022-06-16 PROCEDURE — 87880 STREP A ASSAY W/OPTIC: CPT | Performed by: PHYSICIAN ASSISTANT

## 2022-06-16 PROCEDURE — 99283 EMERGENCY DEPT VISIT LOW MDM: CPT

## 2022-06-16 RX ORDER — IBUPROFEN 800 MG/1
800 TABLET ORAL ONCE
Status: COMPLETED | OUTPATIENT
Start: 2022-06-16 | End: 2022-06-16

## 2022-06-16 RX ADMIN — DEXAMETHASONE 6 MG: 2 TABLET ORAL at 15:16

## 2022-06-16 RX ADMIN — IBUPROFEN 800 MG: 800 TABLET ORAL at 15:16

## 2022-06-16 NOTE — DISCHARGE INSTRUCTIONS
You are still testing positive for COVID-19. Your rapid strep is negative, culture was sent to the lab.  Continue over-the-counter cough and cold medications as needed as directed on the box.  You may want to use throat lozenges, salt water gargles, etc.  Continue allergy medicine as directed.  Try to follow-up with your primary care provider in the next few days to reevaluate symptoms and ensure you are improving.  You will need to continue to quarantine per CDC recommendations.  Return to the ER for any change in worsening of symptoms, or any additional concerns including but not limited to inability to manage secretions, severe neck pain or swelling, fever greater than 100.4, intractable vomiting.

## 2022-06-16 NOTE — ED PROVIDER NOTES
Subjective   Patient is a 22-year-old female history of anemia, anxiety, depression, IBS and scoliosis presenting to the ER for evaluation of sore throat.  Patient states her symptoms began on Monday night 06/13/22.  She states she has had a lot of nasal congestion and drainage, thought this pain was related her seasonal allergies. She states the pain has began to worsen over the past couple of days, supposedly had a rapid strep performed at urgent treatment center that was negative.  She states that when she swallows she has pain that shoots up towards her right ear and she has had nausea.  She states she also has a headache.  She says she has been taking DayQuil and Xyzal at home to help.  Denies any known fever, chills, dizziness, syncope, chest pain, productive cough, shortness of breath, abdominal pain dysuria, hematuria, or any other symptoms.          Review of Systems   Constitutional: Negative for chills and fever.   HENT: Positive for ear pain and sore throat. Negative for congestion and rhinorrhea.    Eyes: Negative.    Respiratory: Negative.    Cardiovascular: Negative.    Gastrointestinal: Negative.    Genitourinary: Negative.    Musculoskeletal: Negative.    Skin: Negative.    Neurological: Positive for headaches. Negative for dizziness.   Psychiatric/Behavioral: Negative.        Past Medical History:   Diagnosis Date   • Anemia    • Anxiety    • Depression    • IBS (irritable bowel syndrome)    • Scoliosis        No Known Allergies    History reviewed. No pertinent surgical history.    Family History   Problem Relation Age of Onset   • Sjogren's syndrome Mother    • Lupus Mother    • Fibromyalgia Mother    • Osteosarcoma Brother    • Diabetes Other    • Mental illness Other    • Migraines Other    • Obesity Other        Social History     Socioeconomic History   • Marital status: Single   Tobacco Use   • Smoking status: Former Smoker     Packs/day: 0.25     Years: 0.50     Pack years: 0.12     Quit  "date: 2019     Years since quittin.5   • Smokeless tobacco: Never Used   Vaping Use   • Vaping Use: Every day   • Substances: Nicotine   • Devices: Disposable   Substance and Sexual Activity   • Alcohol use: Yes   • Drug use: Yes     Types: Marijuana     Comment: 0.5 g   • Sexual activity: Never           Objective   Physical Exam  Vitals and nursing note reviewed.     /78 (BP Location: Left arm)   Pulse 74   Temp 98.2 °F (36.8 °C) (Oral)   Resp 16   Ht 165.1 cm (65\")   Wt 109 kg (241 lb)   LMP 2022 (Exact Date)   SpO2 99%   BMI 40.10 kg/m²     GEN: No acute distress, sitting upright in stretcher.  Awake and alert, does not appear septic or toxic.  She is managing secretions without difficulty.  Head: Normocephalic, atraumatic  Eyes: EOM intact  ENT: Posterior pharynx normal in appearance, tonsils are symmetric, no exudate or edema noted.  Uvula is midline.  No peritonsillar edema.  Soft palate elevates symmetrically with phonation. TMs clear bilaterally without bulging or erythema.  Neck: Full range of motion, no crepitus.  No obvious satiated.  Chest: Nontender to palpation  Cardiovascular: Regular rate and rhythm  Lungs: Clear to auscultation bilaterally without adventitious sounds  Abdomen: Soft, nontender, nondistended, no peritoneal signs  Extremities: No edema, normal appearance  Neuro: GCS 15  Psych: Mood and affect are appropriate    Procedures           ED Course  ED Course as of 22   Thu 2022   1449 Patient denies any concern for pregnancy. [LA]   1633 COVID19(!!): Detected [LA]   1634 Discussed all findings with the patient.  She states she tested positive for COVID at the end of May.  Discussed symptomatic treatment, follow-up and strict return precautions.  Patient verbalized understanding and was in agreement with this plan of care. [LA]      ED Course User Index  [LA] Ritu Raymundo PA-C                                                 [facility]  Number of " Diagnoses or Management Options  COVID-19  Sore throat  Diagnosis management comments: On arrival, patient is stable.  She is afebrile, no acute distress, nontoxic in appearance.  Differential could include URI, pharyngitis, tonsillitis, allergic rhinitis, and other concerns.  Patient denies concern for pregnancy.  Will obtain rapid strep and respiratory panel.  Will give steroid and ibuprofen to help with pain.    Patient tested positive for COVID, she states she had originally tested positive at the end of May so this may not be an acutely positive test.  Rapid strep was negative.  This was sent for culture.  Discussed symptomatic treatment, follow-up and strict return precautions.  Patient verbalized understanding and was in agreement with this plan of care.       Amount and/or Complexity of Data Reviewed  Clinical lab tests: reviewed and ordered  Review and summarize past medical records: yes  Discuss the patient with other providers: yes    Risk of Complications, Morbidity, and/or Mortality  Presenting problems: low  Diagnostic procedures: low  Management options: low    Patient Progress  Patient progress: stable      Final diagnoses:   Sore throat   COVID-19       ED Disposition  ED Disposition     ED Disposition   Discharge    Condition   Stable    Comment   --             Michelle Kat N, APRN  [address] [address]  [phone]    Schedule an appointment as soon as possible for a visit            Medication List      No changes were made to your prescriptions during this visit.          Ritu Raymundo PA-C  06/16/22 [id number]

## 2022-06-18 LAB — BACTERIA SPEC AEROBE CULT: NORMAL

## 2022-07-06 DIAGNOSIS — F41.9 ANXIETY: ICD-10-CM

## 2022-07-06 RX ORDER — BUSPIRONE HYDROCHLORIDE 5 MG/1
TABLET ORAL
Qty: 60 TABLET | Refills: 0 | Status: SHIPPED | OUTPATIENT
Start: 2022-07-06 | End: 2022-07-28 | Stop reason: SDUPTHER

## 2022-07-06 RX ORDER — HYDROXYZINE 50 MG/1
TABLET, FILM COATED ORAL
Qty: 90 TABLET | Refills: 0 | Status: SHIPPED | OUTPATIENT
Start: 2022-07-06 | End: 2022-10-27 | Stop reason: SINTOL

## 2022-07-28 ENCOUNTER — OFFICE VISIT (OUTPATIENT)
Dept: BEHAVIORAL HEALTH | Facility: CLINIC | Age: 22
End: 2022-07-28

## 2022-07-28 VITALS
BODY MASS INDEX: 40.48 KG/M2 | DIASTOLIC BLOOD PRESSURE: 70 MMHG | SYSTOLIC BLOOD PRESSURE: 120 MMHG | HEIGHT: 65 IN | WEIGHT: 243 LBS

## 2022-07-28 DIAGNOSIS — F41.9 ANXIETY: ICD-10-CM

## 2022-07-28 DIAGNOSIS — F90.2 ADHD (ATTENTION DEFICIT HYPERACTIVITY DISORDER), COMBINED TYPE: Primary | ICD-10-CM

## 2022-07-28 DIAGNOSIS — F33.1 MODERATE EPISODE OF RECURRENT MAJOR DEPRESSIVE DISORDER: ICD-10-CM

## 2022-07-28 PROCEDURE — 99214 OFFICE O/P EST MOD 30 MIN: CPT

## 2022-07-28 RX ORDER — DESVENLAFAXINE SUCCINATE 50 MG/1
50 TABLET, EXTENDED RELEASE ORAL DAILY
Qty: 30 TABLET | Refills: 2 | Status: SHIPPED | OUTPATIENT
Start: 2022-07-28 | End: 2022-10-27 | Stop reason: SDUPTHER

## 2022-07-28 RX ORDER — BUSPIRONE HYDROCHLORIDE 5 MG/1
5 TABLET ORAL 2 TIMES DAILY
Qty: 60 TABLET | Refills: 0 | Status: SHIPPED | OUTPATIENT
Start: 2022-07-28 | End: 2022-09-15

## 2022-07-28 NOTE — PROGRESS NOTES
Follow Up Office Visit    Patient Name: Hanny Carbone  : 2000   MRN: [id number]   Care Team: Patient Care Team:  Michelle Kat APRN as PCP - General (Family Medicine)        Chief Complaint:    Chief Complaint   Patient presents with   • ADHD   • Anxiety   • Depression   • Med Management       History of Present Illness: Hanny Carbone is a 22 y.o. female who is here today for medication management follow up. Patient states that Buspar, Pristiq and Hydroxyzine significantly help manager her mood and anxiety. She is not currently taking her Adderall as she feels she is not currently doing anything that requires her to need it. Patient states she is able to complete her work and keep her living space clean and taken care and she is happy to report that.     Subjective   Review of Systems:    Review of Systems   All other systems reviewed and are negative.      Current Medications:   Current Outpatient Medications   Medication Sig Dispense Refill   • amphetamine-dextroamphetamine (Adderall) 10 MG tablet Take 10 mg orally every morning and afternoon. 60 tablet 0   • busPIRone (BUSPAR) 5 MG tablet Take 1 tablet by mouth 2 (Two) Times a Day. 60 tablet 0   • desvenlafaxine (PRISTIQ) 50 MG 24 hr tablet Take 1 tablet by mouth Daily. INSURANCE PREFERS GENERIC 30 tablet 2   • fexofenadine (ALLEGRA) 60 MG tablet Take 60 mg by mouth Daily.     • hydrOXYzine (ATARAX) 50 MG tablet TAKE 1 TABLET BY MOUTH THREE TIMES A DAY AS NEEDED FOR ANXIETY AND/OR SLEEP 90 tablet 0   • ibuprofen (ADVIL,MOTRIN) 200 MG tablet Take 200 mg by mouth Every 6 (Six) Hours As Needed for Mild Pain .     • ondansetron ODT (Zofran ODT) 4 MG disintegrating tablet Place 1 tablet on the tongue Every 6 (Six) Hours As Needed for Nausea. 10 tablet 0   • vitamin D (ERGOCALCIFEROL) 1.25 MG (23433 UT) capsule capsule Take 1 capsule by mouth 1 (One) Time Per Week. 6 capsule 0     No current facility-administered medications for this visit.       Mental  "Status Exam:   Hygiene:   good  Cooperation:  Cooperative  Eye Contact:  Good  Psychomotor Behavior:  Appropriate  Affect:  Appropriate  Mood: normal  Speech:  Normal  Thought Process:  Goal directed and Linear  Thought Content:  Normal  Suicidal:  None  Homicidal:  None  Hallucinations:  None  Delusion:  None  Memory:  Intact  Orientation:  Person, Place, Time and Situation  Reliability:  good  Insight:  Good  Judgement:  Good  Impulse Control:  Good  Physical/Medical Issues:  No      Objective   Vital Signs:   /70   Ht 165.1 cm (65\")   Wt 110 kg (243 lb)   BMI 40.44 kg/m²       Assessment / Plan    Diagnoses and all orders for this visit:    1. ADHD (attention deficit hyperactivity disorder), combined type (Primary)      2. Moderate episode of recurrent major depressive disorder (HCC)  -     busPIRone (BUSPAR) 5 MG tablet; Take 1 tablet by mouth 2 (Two) Times a Day.  Dispense: 60 tablet; Refill: 0  -     desvenlafaxine (PRISTIQ) 50 MG 24 hr tablet; Take 1 tablet by mouth Daily. INSURANCE PREFERS GENERIC  Dispense: 30 tablet; Refill: 2    3. Anxiety  -     busPIRone (BUSPAR) 5 MG tablet; Take 1 tablet by mouth 2 (Two) Times a Day.  Dispense: 60 tablet; Refill: 0  -     desvenlafaxine (PRISTIQ) 50 MG 24 hr tablet; Take 1 tablet by mouth Daily. INSURANCE PREFERS GENERIC  Dispense: 30 tablet; Refill: 2    Continue Buspar and Pristiq as ordered.     A psychological evaluation was conducted in order to assess past and current level of functioning. Areas assessed included, but were not limited to: perception of social support, perception of ability to face and deal with challenges in life (positive functioning), anxiety symptoms, depressive symptoms, perspective on beliefs/belief system, coping skills for stress, intelligence level,  Therapeutic rapport was established. Interventions conducted today were geared towards incorporating medication management along with support for continued therapy. Education was " also provided as to the med management with this provider and what to expect in subsequent sessions.      We discussed risks, benefits, goals and side effects of the above medication and the patient was agreeable with the plan. Patient was educated on the importance of compliance with treatment and follow-up appointments. Patient is aware to contact [facility] with any worsening of symptoms. To call for questions or concerns and return early if necessary. Patent is agreeable to go to the Emergency Department or call 911 should they begin SI/HI.      PHQ-2/PHQ-9: Depression Screening  Little Interest or Pleasure in Doing Things: 1-->several days  Feeling Down, Depressed or Hopeless: 1-->several days  PHQ-2 Total Score: 2  Trouble Falling or Staying Asleep, or Sleeping Too Much: 2-->more than half the days  Feeling Tired or Having Little Energy: 1-->several days  Poor Appetite or Overeatin-->several days  Feeling Bad about Yourself - or that You are a Failure or Have Let Yourself or Your Family Down: 1-->several days  Trouble Concentrating on Things, Such as Reading the Newspaper or Watching Television: 3-->nearly every day  Moving or Speaking So Slowly that Other People Could Have Noticed? Or the Opposite - Being So Fidgety: 1-->several days  Thoughts that You Would be Better Off Dead or of Hurting Yourself in Some Way: 1-->several days  PHQ-9: Brief Depression Severity Measure Score: 12  If You Checked Off Any Problems, How Difficult Have These Problems Made It For You to Do Your Work, Take Care of Things at Home, or Get Along with Other People?: very difficult      PHQ-9 Score:   PHQ-9 Total Score: 12    Depression Screening:  Patient screened positive for depression based on a PHQ-9 score of 12 on 2022. Follow-up recommendations include: Suicide Risk Assessment performed.      JAIME-7  Over the last two weeks, how often have you been bothered by the following problems?  Feeling nervous, anxious or  on edge: Several days  Not being able to stop or control worrying: Several days  Worrying too much about different things: More than half the days  Trouble Relaxing: Several days  Being so restless that it is hard to sit still: Several days  Becoming easily annoyed or irritable: Several days  Feeling afraid as if something awful might happen: Several days  JAIME 7 Total Score: 8  If you checked any problems, how difficult have these problems made it for you to do your work, take care of things at home, or get along with other people: Somewhat difficult      Screening for Adults With ADHD - (1-6)  1. How often do you have trouble wrapping up the final details of a project, once the challenging parts have been done?: Often  2. How often do you have difficulty getting things in order when you have to do a task that requires organization?: Often  3. How often do you have problems remembering appointments or obligations : Sometimes  4. When you have a task that requires a lot of thought, how often do you avoid or delay getting started ?: Often  5. How often do you fidget or squirm with your hands or feet when you have to sit down for a long time?: Often  6. How often do you feel overly active and compelled to do things, like you were driven by a motor?: Sometimes  7. How often do you make careless mistakes when you have to work on a boring or difficult project?: Sometimes  8. How often do have difficulty keeping your attention when you are doing boring or repetitive work?: Very Often  9. How often do you have difficulty concentrating on what people say to you, even when they are speaking to you: Often  10.How often do you misplace or have difficulty finding things at home or at work?: Rarely  11.How often are you distracted by activity or noise around you?: Very Often  12.How often do you leave your seat in meetings or other situations in which you are expected to remain seated?: Rarely  13.How often do you feel restless  or fidgety?: Often  14.How often do you have difficulty unwinding and relaxing when you have time to yourself?: Sometimes  15.How often do you find yourself talking too much when you are in social situations?: Sometimes  16.When you’re in a conversation, how often do you find yourself finishing the sentences of the people you are talking to, before they can finish them themselves?: Sometimes  17.How often do you have difficulty waiting your turn in situations when turn taking is required?: Sometimes  18.How often do you interrupt others when they are busy?: Rarely          MEDS ORDERED DURING VISIT:  New Medications Ordered This Visit   Medications   • busPIRone (BUSPAR) 5 MG tablet     Sig: Take 1 tablet by mouth 2 (Two) Times a Day.     Dispense:  60 tablet     Refill:  0   • desvenlafaxine (PRISTIQ) 50 MG 24 hr tablet     Sig: Take 1 tablet by mouth Daily. INSURANCE PREFERS GENERIC     Dispense:  30 tablet     Refill:  2         Follow Up   Return in about 3 months (around 10/28/2022).  Patient was given instructions and counseling regarding her condition or for health maintenance advice. Please see specific information pulled into the AVS if appropriate.     TREATMENT PLAN/GOALS: Continue supportive psychotherapy efforts and medications as indicated. Treatment and medication options discussed during today's visit. Patient acknowledged and verbally consented to continue with current treatment plan and was educated on the importance of compliance with treatment and follow-up appointments.    MEDICATION ISSUES:  Discussed medication options and treatment plan of prescribed medication as well as the risks, benefits, and side effects including potential falls, possible impaired driving and metabolic adversities among others. Patient is agreeable to call the office with any worsening of symptoms or onset of side effects. Patient is agreeable to call 911 or go to the nearest ER should he/she begin having  SI/HI.        [facility]  [address] [place] [address] [phone]    July 28, 2022 15:02 EDT

## 2022-09-15 DIAGNOSIS — F33.1 MODERATE EPISODE OF RECURRENT MAJOR DEPRESSIVE DISORDER: ICD-10-CM

## 2022-09-15 DIAGNOSIS — F41.9 ANXIETY: ICD-10-CM

## 2022-09-15 RX ORDER — BUSPIRONE HYDROCHLORIDE 5 MG/1
TABLET ORAL
Qty: 60 TABLET | Refills: 0 | Status: SHIPPED | OUTPATIENT
Start: 2022-09-15 | End: 2022-10-17

## 2022-09-15 NOTE — TELEPHONE ENCOUNTER
Rx Refill Note  Requested Prescriptions     Pending Prescriptions Disp Refills   • busPIRone (BUSPAR) 5 MG tablet [Pharmacy Med Name: BUSPIRONE HCL 5 MG TABLET] 60 tablet 0     Sig: TAKE 1 TABLET BY MOUTH TWICE A DAY      Last office visit with prescribing clinician: 7/28/2022      Next office visit with prescribing clinician: 10/27/2022            Feliz Benton MA  09/15/22, 16:19 EDT

## 2022-09-23 ENCOUNTER — LAB (OUTPATIENT)
Dept: LAB | Facility: HOSPITAL | Age: 22
End: 2022-09-23

## 2022-09-23 ENCOUNTER — TRANSCRIBE ORDERS (OUTPATIENT)
Dept: LAB | Facility: HOSPITAL | Age: 22
End: 2022-09-23

## 2022-09-23 DIAGNOSIS — M13.0 POLYARTHROPATHY: ICD-10-CM

## 2022-09-23 DIAGNOSIS — M79.10 MYALGIA: ICD-10-CM

## 2022-09-23 DIAGNOSIS — M13.0 POLYARTHROPATHY: Primary | ICD-10-CM

## 2022-09-23 LAB — ERYTHROCYTE [SEDIMENTATION RATE] IN BLOOD: 3 MM/HR (ref 0–20)

## 2022-09-23 PROCEDURE — 36415 COLL VENOUS BLD VENIPUNCTURE: CPT

## 2022-09-23 PROCEDURE — 86140 C-REACTIVE PROTEIN: CPT

## 2022-09-23 PROCEDURE — 82550 ASSAY OF CK (CPK): CPT

## 2022-09-23 PROCEDURE — 85652 RBC SED RATE AUTOMATED: CPT

## 2022-09-24 LAB
CK SERPL-CCNC: 97 U/L (ref 20–180)
CRP SERPL-MCNC: <0.3 MG/DL (ref 0–0.5)

## 2022-10-16 DIAGNOSIS — F41.9 ANXIETY: ICD-10-CM

## 2022-10-16 DIAGNOSIS — F33.1 MODERATE EPISODE OF RECURRENT MAJOR DEPRESSIVE DISORDER: ICD-10-CM

## 2022-10-17 RX ORDER — BUSPIRONE HYDROCHLORIDE 5 MG/1
TABLET ORAL
Qty: 60 TABLET | Refills: 0 | Status: SHIPPED | OUTPATIENT
Start: 2022-10-17 | End: 2022-10-27 | Stop reason: SDUPTHER

## 2022-10-27 ENCOUNTER — OFFICE VISIT (OUTPATIENT)
Dept: BEHAVIORAL HEALTH | Facility: CLINIC | Age: 22
End: 2022-10-27

## 2022-10-27 VITALS — HEIGHT: 65 IN | BODY MASS INDEX: 40.35 KG/M2 | WEIGHT: 242.2 LBS

## 2022-10-27 DIAGNOSIS — F90.2 ADHD (ATTENTION DEFICIT HYPERACTIVITY DISORDER), COMBINED TYPE: ICD-10-CM

## 2022-10-27 DIAGNOSIS — F41.9 ANXIETY: ICD-10-CM

## 2022-10-27 DIAGNOSIS — F33.1 MODERATE EPISODE OF RECURRENT MAJOR DEPRESSIVE DISORDER: Primary | ICD-10-CM

## 2022-10-27 PROCEDURE — 99214 OFFICE O/P EST MOD 30 MIN: CPT

## 2022-10-27 RX ORDER — DESVENLAFAXINE SUCCINATE 50 MG/1
50 TABLET, EXTENDED RELEASE ORAL DAILY
Qty: 30 TABLET | Refills: 1 | Status: SHIPPED | OUTPATIENT
Start: 2022-10-27 | End: 2022-12-22 | Stop reason: SDUPTHER

## 2022-10-27 RX ORDER — ATOMOXETINE 25 MG/1
25 CAPSULE ORAL 2 TIMES DAILY
Qty: 60 CAPSULE | Refills: 1 | Status: SHIPPED | OUTPATIENT
Start: 2022-10-27 | End: 2022-11-08 | Stop reason: SDUPTHER

## 2022-10-27 RX ORDER — BUSPIRONE HYDROCHLORIDE 5 MG/1
5 TABLET ORAL 2 TIMES DAILY
Qty: 60 TABLET | Refills: 1 | Status: SHIPPED | OUTPATIENT
Start: 2022-10-27 | End: 2022-12-22 | Stop reason: SDUPTHER

## 2022-10-27 NOTE — PROGRESS NOTES
Follow Up Office Visit    Patient Name: Hanny Carbone  : 2000   MRN: [id number]   Care Team: Patient Care Team:  Michelle Kat APRN as PCP - General (Family Medicine)  Amanda Pelaez APRN as Nurse Practitioner (Behavioral Health)        Chief Complaint:    Chief Complaint   Patient presents with   • ADHD   • Anxiety   • Depression   • Med Management       History of Present Illness: Hanny Carbone is a 22 y.o. female who is here today for a medication management follow up. Patient states everything with medication is going well. She does state she is not taking the Hydroxyzine because it makes her too groggy the following. She states the Buspar helps with her anxiety enough that she feels that she doesn't really need they Hydroxyzine. Patient still not taking the Paddleball as it hurts her stomach too much and would be willing to try something that is not a stimulant. Pristiq and Buspar help significantly with her mood and anxiety. Patient reports that this is the best she has felt in a long time.       Subjective   Review of Systems:    Review of Systems   Psychiatric/Behavioral: Positive for decreased concentration. The patient is nervous/anxious.    All other systems reviewed and are negative.      Current Medications:   Current Outpatient Medications   Medication Sig Dispense Refill   • busPIRone (BUSPAR) 5 MG tablet Take 1 tablet by mouth 2 (Two) Times a Day. 60 tablet 1   • desvenlafaxine (PRISTIQ) 50 MG 24 hr tablet Take 1 tablet by mouth Daily. INSURANCE PREFERS GENERIC 30 tablet 1   • fexofenadine (ALLEGRA) 60 MG tablet Take 60 mg by mouth Daily.     • ibuprofen (ADVIL,MOTRIN) 200 MG tablet Take 200 mg by mouth Every 6 (Six) Hours As Needed for Mild Pain .     • ondansetron ODT (Zofran ODT) 4 MG disintegrating tablet Place 1 tablet on the tongue Every 6 (Six) Hours As Needed for Nausea. 10 tablet 0   • vitamin D (ERGOCALCIFEROL) 1.25 MG (52346 UT) capsule capsule Take 1 capsule by mouth  "1 (One) Time Per Week. 6 capsule 0   • atomoxetine (Strattera) 25 MG capsule Take 1 capsule by mouth 2 (Two) Times a Day. 60 capsule 1     No current facility-administered medications for this visit.       Mental Status Exam:   Hygiene:   good  Cooperation:  Cooperative  Eye Contact:  Good  Psychomotor Behavior:  Appropriate  Affect:  Appropriate  Mood: normal and anxious  Speech:  Normal  Thought Process:  Goal directed and Linear  Thought Content:  Normal and Mood congruent  Suicidal:  None  Homicidal:  None  Hallucinations:  None  Delusion:  None  Memory:  Intact  Orientation:  Person, Place, Time and Situation  Reliability:  good  Insight:  Good  Judgement:  Good  Impulse Control:  Good  Physical/Medical Issues:  No      Objective   Vital Signs:   Ht 165.1 cm (65\")   Wt 110 kg (242 lb 3.2 oz)   BMI 40.30 kg/m²       Assessment / Plan    Diagnoses and all orders for this visit:    1. Moderate episode of recurrent major depressive disorder (HCC) (Primary)  -     desvenlafaxine (PRISTIQ) 50 MG 24 hr tablet; Take 1 tablet by mouth Daily. INSURANCE PREFERS GENERIC  Dispense: 30 tablet; Refill: 1  -     busPIRone (BUSPAR) 5 MG tablet; Take 1 tablet by mouth 2 (Two) Times a Day.  Dispense: 60 tablet; Refill: 1    2. Anxiety  -     desvenlafaxine (PRISTIQ) 50 MG 24 hr tablet; Take 1 tablet by mouth Daily. INSURANCE PREFERS GENERIC  Dispense: 30 tablet; Refill: 1  -     busPIRone (BUSPAR) 5 MG tablet; Take 1 tablet by mouth 2 (Two) Times a Day.  Dispense: 60 tablet; Refill: 1    3. ADHD (attention deficit hyperactivity disorder), combined type  -     atomoxetine (Strattera) 25 MG capsule; Take 1 capsule by mouth 2 (Two) Times a Day.  Dispense: 60 capsule; Refill: 1       Will discontinue Adderall and Hydroxyzine. Will start Strattera and continue all other medication as ordered.     A psychological evaluation was conducted in order to assess past and current level of functioning. Areas assessed included, but were not " limited to: perception of social support, perception of ability to face and deal with challenges in life (positive functioning), anxiety symptoms, depressive symptoms, perspective on beliefs/belief system, coping skills for stress, intelligence level,  Therapeutic rapport was established. Interventions conducted today were geared towards incorporating medication management along with support for continued therapy. Education was also provided as to the med management with this provider and what to expect in subsequent sessions.      We discussed risks, benefits, goals and side effects of the above medication and the patient was agreeable with the plan. Patient was educated on the importance of compliance with treatment and follow-up appointments. Patient is aware to contact the [facility] with any worsening of symptoms. To call for questions or concerns and return early if necessary. Patent is agreeable to go to the Emergency Department or call 911 should they begin SI/HI.        PHQ-2/PHQ-9: Depression Screening  Little Interest or Pleasure in Doing Things: 1-->several days  Feeling Down, Depressed or Hopeless: 1-->several days  PHQ-2 Total Score: 2  Trouble Falling or Staying Asleep, or Sleeping Too Much: 2-->more than half the days  Feeling Tired or Having Little Energy: 1-->several days  Poor Appetite or Overeatin-->more than half the days  Feeling Bad about Yourself - or that You are a Failure or Have Let Yourself or Your Family Down: 1-->several days  Trouble Concentrating on Things, Such as Reading the Newspaper or Watching Television: 1-->several days  Moving or Speaking So Slowly that Other People Could Have Noticed? Or the Opposite - Being So Fidgety: 1-->several days  Thoughts that You Would be Better Off Dead or of Hurting Yourself in Some Way: 1-->several days  PHQ-9: Brief Depression Severity Measure Score: 11  If You Checked Off Any Problems, How Difficult Have These Problems Made It For You to  Do Your Work, Take Care of Things at Home, or Get Along with Other People?: somewhat difficult    PHQ-9 Score:   PHQ-9 Total Score: 11    Depression Screening:  Patient screened positive for depression based on a PHQ-9 score of 11 on 10/27/2022. Follow-up recommendations include: Prescribed antidepressant medication treatment and Suicide Risk Assessment performed.    Over the last two weeks, how often have you been bothered by the following problems?  Feeling nervous, anxious or on edge: Several days  Not being able to stop or control worrying: Several days  Worrying too much about different things: Several days  Trouble Relaxing: Several days  Being so restless that it is hard to sit still: Several days  Becoming easily annoyed or irritable: Several days  Feeling afraid as if something awful might happen: Several days  JAIME 7 Total Score: 7  If you checked any problems, how difficult have these problems made it for you to do your work, take care of things at home, or get along with other people: Somewhat difficult      Screening for Adults With ADHD - (1-6)  1. How often do you have trouble wrapping up the final details of a project, once the challenging parts have been done?: Often  2. How often do you have difficulty getting things in order when you have to do a task that requires organization?: Often  3. How often do you have problems remembering appointments or obligations : Rarely  4. When you have a task that requires a lot of thought, how often do you avoid or delay getting started ?: Very Often  5. How often do you fidget or squirm with your hands or feet when you have to sit down for a long time?: Very Often  6. How often do you feel overly active and compelled to do things, like you were driven by a motor?: Sometimes  7. How often do you make careless mistakes when you have to work on a boring or difficult project?: Often  8. How often do have difficulty keeping your attention when you are doing boring or  repetitive work?: Very Often  9. How often do you have difficulty concentrating on what people say to you, even when they are speaking to you: Often  10.How often do you misplace or have difficulty finding things at home or at work?: Sometimes  11.How often are you distracted by activity or noise around you?: Very Often  12.How often do you leave your seat in meetings or other situations in which you are expected to remain seated?: Sometimes  13.How often do you feel restless or fidgety?: Very Often  14.How often do you have difficulty unwinding and relaxing when you have time to yourself?: Sometimes  15.How often do you find yourself talking too much when you are in social situations?: Sometimes  16.When you’re in a conversation, how often do you find yourself finishing the sentences of the people you are talking to, before they can finish them themselves?: Sometimes  17.How often do you have difficulty waiting your turn in situations when turn taking is required?: Sometimes  18.How often do you interrupt others when they are busy?: Sometimes            MEDS ORDERED DURING VISIT:  New Medications Ordered This Visit   Medications   • desvenlafaxine (PRISTIQ) 50 MG 24 hr tablet     Sig: Take 1 tablet by mouth Daily. INSURANCE PREFERS GENERIC     Dispense:  30 tablet     Refill:  1   • busPIRone (BUSPAR) 5 MG tablet     Sig: Take 1 tablet by mouth 2 (Two) Times a Day.     Dispense:  60 tablet     Refill:  1   • atomoxetine (Strattera) 25 MG capsule     Sig: Take 1 capsule by mouth 2 (Two) Times a Day.     Dispense:  60 capsule     Refill:  1         Follow Up   Return in about 8 weeks (around 12/22/2022).  Patient was given instructions and counseling regarding her condition or for health maintenance advice. Please see specific information pulled into the AVS if appropriate.     TREATMENT PLAN/GOALS: Continue supportive psychotherapy efforts and medications as indicated. Treatment and medication options discussed  during today's visit. Patient acknowledged and verbally consented to continue with current treatment plan and was educated on the importance of compliance with treatment and follow-up appointments.    MEDICATION ISSUES:  Discussed medication options and treatment plan of prescribed medication as well as the risks, benefits, and side effects including potential falls, possible impaired driving and metabolic adversities among others. Patient is agreeable to call the office with any worsening of symptoms or onset of side effects. Patient is agreeable to call 911 or go to the nearest ER should he/she begin having SI/HI.    [facility] [address] [address]  [phone]    October 27, 2022 14:27 EDT

## 2022-11-01 ENCOUNTER — PRIOR AUTHORIZATION (OUTPATIENT)
Dept: INTERNAL MEDICINE | Facility: CLINIC | Age: 22
End: 2022-11-01

## 2022-11-08 DIAGNOSIS — F90.2 ADHD (ATTENTION DEFICIT HYPERACTIVITY DISORDER), COMBINED TYPE: ICD-10-CM

## 2022-11-08 RX ORDER — ATOMOXETINE 25 MG/1
25 CAPSULE ORAL DAILY
Qty: 30 CAPSULE | Refills: 1 | Status: SHIPPED | OUTPATIENT
Start: 2022-11-08 | End: 2022-12-22

## 2022-11-17 ENCOUNTER — OFFICE VISIT (OUTPATIENT)
Dept: INTERNAL MEDICINE | Facility: CLINIC | Age: 22
End: 2022-11-17

## 2022-11-17 VITALS
OXYGEN SATURATION: 96 % | DIASTOLIC BLOOD PRESSURE: 76 MMHG | BODY MASS INDEX: 39.82 KG/M2 | HEIGHT: 65 IN | TEMPERATURE: 98.6 F | SYSTOLIC BLOOD PRESSURE: 116 MMHG | WEIGHT: 239 LBS | HEART RATE: 85 BPM

## 2022-11-17 DIAGNOSIS — G89.29 CHRONIC JOINT PAIN: ICD-10-CM

## 2022-11-17 DIAGNOSIS — M25.50 CHRONIC JOINT PAIN: ICD-10-CM

## 2022-11-17 DIAGNOSIS — Z30.09 BIRTH CONTROL COUNSELING: ICD-10-CM

## 2022-11-17 DIAGNOSIS — M41.125 ADOLESCENT IDIOPATHIC SCOLIOSIS OF THORACOLUMBAR REGION: ICD-10-CM

## 2022-11-17 DIAGNOSIS — M35.7 HYPERMOBILITY SYNDROME: Primary | ICD-10-CM

## 2022-11-17 LAB
B-HCG UR QL: NEGATIVE
EXPIRATION DATE: NORMAL
INTERNAL NEGATIVE CONTROL: NEGATIVE
INTERNAL POSITIVE CONTROL: POSITIVE
Lab: NORMAL

## 2022-11-17 PROCEDURE — 81025 URINE PREGNANCY TEST: CPT | Performed by: NURSE PRACTITIONER

## 2022-11-17 PROCEDURE — 99213 OFFICE O/P EST LOW 20 MIN: CPT | Performed by: NURSE PRACTITIONER

## 2022-11-17 RX ORDER — MELOXICAM 15 MG/1
TABLET ORAL
COMMUNITY
Start: 2022-10-28

## 2022-11-17 NOTE — PROGRESS NOTES
"  Office Visit      Patient Name: Hanny Carbone  : 2000   MRN: [id number]   Care Team: Patient Care Team:  Michelle Kat APRN as PCP - General (Family Medicine)  Amanda Pelaez APRN as Nurse Practitioner (Behavioral Health)    Chief Complaint  Contraception and Abstract (Following up after rheumatologist/)    Subjective     Subjective      Hanny Carbone presents to [facility] for rheumatology follow-up and contraception discussion.   Saw Dr. Cadet with rheumatology who performed lab work up for chronic pain and myalgia. Labs were unremarkable. She was given meloxicam for joint pain and does help. 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 She states she has hypermobile joints, \"stretchy skin\", and scoliosis. She has no history of joint dislocation.   Interested in contraception.  She is sexually active and does not desire children at this time. She has done some research, leaning more towards kyleena or copper IUD. She is not good at remembering to take pills and not interested in injectable or patch. Family history of abnormal blood clotting. States her grandfather has a type of clotting disease and several cousins have passed away from blood clots. Never been diagnosed with any of this personally.   She suffers from anxiety and depression but currently feels like symptoms are well controlled with her current medications, this is managed by behavorial health.    Objective     Objective   Vital Signs:   /76   Pulse 85   Temp 98.6 °F (37 °C)   Ht 165.1 cm (65\")   Wt 108 kg (239 lb)   SpO2 96%   BMI 39.77 kg/m²     Physical Exam  Vitals and nursing note reviewed.   Constitutional:       General: She is not in acute distress.     Appearance: Normal appearance. She is obese. She is not toxic-appearing.   Eyes:      Pupils: Pupils are equal, round, and reactive to light.   Cardiovascular:      Rate and Rhythm: Normal " rate and regular rhythm.      Heart sounds: Normal heart sounds. No murmur heard.  Pulmonary:      Effort: Pulmonary effort is normal. No respiratory distress.      Breath sounds: Normal breath sounds. No wheezing.   Abdominal:      General: There is no distension.      Palpations: Abdomen is soft.   Musculoskeletal:         General: Deformity (hypermobility of fingers, wrists, and shoulders appreciated) present.   Skin:     General: Skin is warm and dry.      Findings: No rash.   Neurological:      General: No focal deficit present.      Mental Status: She is alert.   Psychiatric:         Mood and Affect: Mood normal.         Behavior: Behavior normal.       Assessment / Plan      Assessment & Plan   Problem List Items Addressed This Visit        Musculoskeletal and Injuries    Scoliosis of thoracolumbar spine    Relevant Orders    Ambulatory Referral to Genetic Counseling/Testing   Other Visit Diagnoses     Chronic pain  Hypermobility syndrome    -  Primary    Relevant Orders    Ambulatory Referral to Genetic Counseling/Testing    Discussed treatment options include symptomatic treatment. She would still like to meet with genetics to get a formal diagnosis. She does have some symptoms such as hypermobility consistent with najma danlos. Referral placed. Continue meloxicam. Follow-up with rheumatology.     Encounter for initial prescription of contraceptive pills        Referral to OBGYN placed for counseling regarding IUD.          Follow Up   Return if symptoms worsen or fail to improve.  Patient was given instructions and counseling regarding her condition or for health maintenance advice. Please see specific information pulled into the AVS if appropriate.     SEBLE Ramires  [facility]

## 2022-12-15 ENCOUNTER — TELEPHONE (OUTPATIENT)
Dept: INTERNAL MEDICINE | Facility: CLINIC | Age: 22
End: 2022-12-15

## 2022-12-15 DIAGNOSIS — M41.125 ADOLESCENT IDIOPATHIC SCOLIOSIS OF THORACOLUMBAR REGION: ICD-10-CM

## 2022-12-15 DIAGNOSIS — M35.7 HYPERMOBILITY SYNDROME: Primary | ICD-10-CM

## 2022-12-15 NOTE — TELEPHONE ENCOUNTER
THE DOCTOR YOU REFERRED MANA TO IS REQUIRING SHE GET AN ECHOCARDIOGRAM BEFORE SHE SEES HIM.  PLEASE CALL HER TO SET

## 2022-12-22 ENCOUNTER — OFFICE VISIT (OUTPATIENT)
Dept: BEHAVIORAL HEALTH | Facility: CLINIC | Age: 22
End: 2022-12-22

## 2022-12-22 VITALS — WEIGHT: 233.6 LBS | HEIGHT: 65 IN | BODY MASS INDEX: 38.92 KG/M2

## 2022-12-22 DIAGNOSIS — F41.9 ANXIETY: ICD-10-CM

## 2022-12-22 DIAGNOSIS — F33.1 MODERATE EPISODE OF RECURRENT MAJOR DEPRESSIVE DISORDER: ICD-10-CM

## 2022-12-22 DIAGNOSIS — F90.2 ADHD (ATTENTION DEFICIT HYPERACTIVITY DISORDER), COMBINED TYPE: Primary | ICD-10-CM

## 2022-12-22 PROCEDURE — 99214 OFFICE O/P EST MOD 30 MIN: CPT

## 2022-12-22 RX ORDER — BUSPIRONE HYDROCHLORIDE 5 MG/1
5 TABLET ORAL 2 TIMES DAILY
Qty: 60 TABLET | Refills: 2 | Status: SHIPPED | OUTPATIENT
Start: 2022-12-22 | End: 2023-03-23 | Stop reason: SDUPTHER

## 2022-12-22 RX ORDER — DESVENLAFAXINE SUCCINATE 50 MG/1
50 TABLET, EXTENDED RELEASE ORAL DAILY
Qty: 30 TABLET | Refills: 2 | Status: SHIPPED | OUTPATIENT
Start: 2022-12-22 | End: 2023-03-23 | Stop reason: SDUPTHER

## 2022-12-22 RX ORDER — BUPROPION HYDROCHLORIDE 150 MG/1
150 TABLET ORAL EVERY MORNING
Qty: 30 TABLET | Refills: 2 | Status: SHIPPED | OUTPATIENT
Start: 2022-12-22 | End: 2023-03-22

## 2022-12-22 NOTE — PROGRESS NOTES
Follow Up Office Visit    Patient Name: Hanny Carbone  : 2000   MRN: [id number]   Care Team: Patient Care Team:  Michelle Kat APRN as PCP - General (Family Medicine)  Amanda Pelaez APRN as Nurse Practitioner (Behavioral Health)        Chief Complaint:    Chief Complaint   Patient presents with   • ADHD   • Anxiety   • Depression   • Med Management       History of Present Illness: Hanny Carbone is a 22 y.o. female who is here today for a medication management follow up. Patient states overall she feels that medication is working well. She is not taking the Strattera because she doesn't feel that she needs it right now. She does endorse seasonal depression but feels that is managing it well overall. Pristiq and Buspar continue to be effective and helpful but she wonders if there is anything that can be beneficial through the season.     Subjective   Review of Systems:    Review of Systems   All other systems reviewed and are negative.      Current Medications:   Current Outpatient Medications   Medication Sig Dispense Refill   • busPIRone (BUSPAR) 5 MG tablet Take 1 tablet by mouth 2 (Two) Times a Day. 60 tablet 2   • desvenlafaxine (PRISTIQ) 50 MG 24 hr tablet Take 1 tablet by mouth Daily. INSURANCE PREFERS GENERIC 30 tablet 2   • fexofenadine (ALLEGRA) 60 MG tablet Take 60 mg by mouth Daily.     • ibuprofen (ADVIL,MOTRIN) 200 MG tablet Take 200 mg by mouth Every 6 (Six) Hours As Needed for Mild Pain .     • meloxicam (MOBIC) 15 MG tablet TAKE 1 TABLET(S) ORAL AS PRESCRIBED ONCE A DAY AS NEEDED WITH FOOD FOR PAIN.     • ondansetron ODT (Zofran ODT) 4 MG disintegrating tablet Place 1 tablet on the tongue Every 6 (Six) Hours As Needed for Nausea. 10 tablet 0   • vitamin D (ERGOCALCIFEROL) 1.25 MG (63541 UT) capsule capsule Take 1 capsule by mouth 1 (One) Time Per Week. 6 capsule 0   • buPROPion XL (Wellbutrin XL) 150 MG 24 hr tablet Take 1 tablet by mouth Every Morning. 30 tablet 2     No  Telephone Encounter by Kandis Juárez current facility-administered medications for this visit.       Mental Status Exam:   Hygiene:   good  Cooperation:  Cooperative  Eye Contact:  Good  Psychomotor Behavior:  Appropriate  Affect:  Appropriate  Mood: normal, depressed and anxious  Speech:  Normal  Thought Process:  Goal directed and Linear  Thought Content:  Normal and Mood congruent  Suicidal:  None  Homicidal:  None  Hallucinations:  None  Delusion:  None  Memory:  Intact  Orientation:  Person, Place, Time and Situation  Reliability:  good  Insight:  Good  Judgement:  Good  Impulse Control:  Good  Physical/Medical Issues:  Yes see chart     Objective   Vital Signs:   Ht 165.1 cm (65\")   Wt 106 kg (233 lb 9.6 oz)   BMI 38.87 kg/m²       Assessment / Plan    Diagnoses and all orders for this visit:    1. ADHD (attention deficit hyperactivity disorder), combined type (Primary)  -     buPROPion XL (Wellbutrin XL) 150 MG 24 hr tablet; Take 1 tablet by mouth Every Morning.  Dispense: 30 tablet; Refill: 2    2. Moderate episode of recurrent major depressive disorder (HCC)  -     desvenlafaxine (PRISTIQ) 50 MG 24 hr tablet; Take 1 tablet by mouth Daily. INSURANCE PREFERS GENERIC  Dispense: 30 tablet; Refill: 2  -     busPIRone (BUSPAR) 5 MG tablet; Take 1 tablet by mouth 2 (Two) Times a Day.  Dispense: 60 tablet; Refill: 2    3. Anxiety  -     buPROPion XL (Wellbutrin XL) 150 MG 24 hr tablet; Take 1 tablet by mouth Every Morning.  Dispense: 30 tablet; Refill: 2  -     desvenlafaxine (PRISTIQ) 50 MG 24 hr tablet; Take 1 tablet by mouth Daily. INSURANCE PREFERS GENERIC  Dispense: 30 tablet; Refill: 2  -     busPIRone (BUSPAR) 5 MG tablet; Take 1 tablet by mouth 2 (Two) Times a Day.  Dispense: 60 tablet; Refill: 2     Will add Wellbutrin daily. Continue all other medication as ordered.     A psychological evaluation was conducted in order to assess past and current level of functioning. Areas assessed included, but were not limited to: perception of social " support, perception of ability to face and deal with challenges in life (positive functioning), anxiety symptoms, depressive symptoms, perspective on beliefs/belief system, coping skills for stress, intelligence level,  Therapeutic rapport was established. Interventions conducted today were geared towards incorporating medication management along with support for continued therapy. Education was also provided as to the med management with this provider and what to expect in subsequent sessions.      We discussed risks, benefits, goals and side effects of the above medication and the patient was agreeable with the plan. Patient was educated on the importance of compliance with treatment and follow-up appointments. Patient is aware to contact the [facility] with any worsening of symptoms. To call for questions or concerns and return early if necessary. Patent is agreeable to go to the Emergency Department or call 911 should they begin SI/HI.      PHQ-2/PHQ-9: Depression Screening  Little Interest or Pleasure in Doing Things: 2-->more than half the days  Feeling Down, Depressed or Hopeless: 1-->several days  PHQ-2 Total Score: 3  Trouble Falling or Staying Asleep, or Sleeping Too Much: 2-->more than half the days  Feeling Tired or Having Little Energy: 2-->more than half the days  Poor Appetite or Overeatin-->more than half the days  Feeling Bad about Yourself - or that You are a Failure or Have Let Yourself or Your Family Down: 2-->more than half the days  Trouble Concentrating on Things, Such as Reading the Newspaper or Watching Television: 2-->more than half the days  Moving or Speaking So Slowly that Other People Could Have Noticed? Or the Opposite - Being So Fidgety: 1-->several days  Thoughts that You Would be Better Off Dead or of Hurting Yourself in Some Way: 1-->several days  PHQ-9: Brief Depression Severity Measure Score: 15  If You Checked Off Any Problems, How Difficult Have These Problems Made It  For You to Do Your Work, Take Care of Things at Home, or Get Along with Other People?: somewhat difficult      PHQ-9 Score:   PHQ-9 Total Score: 15    Depression Screening:  Patient screened positive for depression based on a PHQ-9 score of 15 on 12/22/2022. Follow-up recommendations include: Prescribed antidepressant medication treatment and Suicide Risk Assessment performed.    Over the last two weeks, how often have you been bothered by the following problems?  Feeling nervous, anxious or on edge: More than half the days  Not being able to stop or control worrying: Several days  Worrying too much about different things: More than half the days  Trouble Relaxing: Several days  Being so restless that it is hard to sit still: Several days  Becoming easily annoyed or irritable: Several days  Feeling afraid as if something awful might happen: Not at all  JAIME 7 Total Score: 8  If you checked any problems, how difficult have these problems made it for you to do your work, take care of things at home, or get along with other people: Somewhat difficult                MEDS ORDERED DURING VISIT:  New Medications Ordered This Visit   Medications   • buPROPion XL (Wellbutrin XL) 150 MG 24 hr tablet     Sig: Take 1 tablet by mouth Every Morning.     Dispense:  30 tablet     Refill:  2   • desvenlafaxine (PRISTIQ) 50 MG 24 hr tablet     Sig: Take 1 tablet by mouth Daily. INSURANCE PREFERS GENERIC     Dispense:  30 tablet     Refill:  2   • busPIRone (BUSPAR) 5 MG tablet     Sig: Take 1 tablet by mouth 2 (Two) Times a Day.     Dispense:  60 tablet     Refill:  2         Follow Up   Return in about 3 months (around 3/22/2023).  Patient was given instructions and counseling regarding her condition or for health maintenance advice. Please see specific information pulled into the AVS if appropriate.     TREATMENT PLAN/GOALS: Continue supportive psychotherapy efforts and medications as indicated. Treatment and medication options  discussed during today's visit. Patient acknowledged and verbally consented to continue with current treatment plan and was educated on the importance of compliance with treatment and follow-up appointments.    MEDICATION ISSUES:  Discussed medication options and treatment plan of prescribed medication as well as the risks, benefits, and side effects including potential falls, possible impaired driving and metabolic adversities among others. Patient is agreeable to call the office with any worsening of symptoms or onset of side effects. Patient is agreeable to call 911 or go to the nearest ER should he/she begin having SI/HI.      SEBLE Colon PC [facility] [address] [address] [phone]    December 22, 2022 11:32 EST

## 2023-01-12 ENCOUNTER — TELEPHONE (OUTPATIENT)
Dept: INTERNAL MEDICINE | Facility: CLINIC | Age: 23
End: 2023-01-12
Payer: COMMERCIAL

## 2023-01-12 NOTE — TELEPHONE ENCOUNTER
Spoke with patient, she is going to contact the insurance company to see if she can obtain information about them denying her echo. She is going to see if they will fax it to the office, if unable to she is going to try and receive it by mail, then drop it by the office.

## 2023-01-12 NOTE — TELEPHONE ENCOUNTER
Caller: Vivek Carbone    Relationship: Self    Best call back number: [phone]    What test/procedure requested: ECHOCARDIOGRAM    When is it needed: ASAP    Where is the test/procedure going to be performed: OFFICE     Additional information or concerns: VIVEK SAID HER [employer]  INSURANCE ASKED THAT THE OFFICE REQUEST THE APPEAL, PHONE -[phone]

## 2023-01-12 NOTE — TELEPHONE ENCOUNTER
Michelle do we have a update on this? I see the last message on the referral was reaching out to pt to get more information?

## 2023-03-07 ENCOUNTER — OFFICE VISIT (OUTPATIENT)
Dept: PSYCHIATRY | Facility: CLINIC | Age: 23
End: 2023-03-07
Payer: COMMERCIAL

## 2023-03-07 DIAGNOSIS — F41.8 DEPRESSION WITH ANXIETY: Primary | ICD-10-CM

## 2023-03-07 PROCEDURE — 90791 PSYCH DIAGNOSTIC EVALUATION: CPT

## 2023-03-07 PROCEDURE — 1159F MED LIST DOCD IN RCRD: CPT

## 2023-03-07 PROCEDURE — 1160F RVW MEDS BY RX/DR IN RCRD: CPT
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
## 2023-03-21 ENCOUNTER — OFFICE VISIT (OUTPATIENT)
Dept: PSYCHIATRY | Facility: CLINIC | Age: 23
End: 2023-03-21
Payer: COMMERCIAL

## 2023-03-21 DIAGNOSIS — F41.8 DEPRESSION WITH ANXIETY: Primary | ICD-10-CM

## 2023-03-21 PROCEDURE — 90837 PSYTX W PT 60 MINUTES: CPT

## 2023-03-21 PROCEDURE — 1159F MED LIST DOCD IN RCRD: CPT

## 2023-03-21 PROCEDURE — 1160F RVW MEDS BY RX/DR IN RCRD: CPT

## 2023-03-21 NOTE — PROGRESS NOTES
Follow Up Adult Note       Date Encounter: 2023   Name: Hanny Carbone  MRN: [id number]  : 2000    Time In: 9:00 am  Time Out: 9:58 am    Referring Provider: Michelle Kat APRN    Chief Complaint:      ICD-10-CM ICD-9-CM   1. Depression with anxiety  F41.8 300.4        History of Present Illness:   Hanny Carbone is a 22 y.o. female who is being seen today for follow up counseling for depression and anxiety.  Patient reports starting a new job today.  She is struggling with ruminating thoughts and excessive worries about her family.  Discussed what is inside and outside of her control.  Discussed cognitive triangle and explored how thoughts, emotions, and behaviors affect one another.       Subjective         Patient's Support Network Includes:  friends and sharonda    Functional Status: Mild impairment     Medications:     Current Outpatient Medications:   •  buPROPion XL (WELLBUTRIN XL) 150 MG 24 hr tablet, TAKE 1 TABLET BY MOUTH EVERY DAY IN THE MORNING, Disp: 30 tablet, Rfl: 0  •  busPIRone (BUSPAR) 5 MG tablet, Take 1 tablet by mouth 2 (Two) Times a Day., Disp: 60 tablet, Rfl: 2  •  desvenlafaxine (PRISTIQ) 50 MG 24 hr tablet, Take 1 tablet by mouth Daily. INSURANCE PREFERS GENERIC, Disp: 30 tablet, Rfl: 2  •  fexofenadine (ALLEGRA) 60 MG tablet, Take 60 mg by mouth Daily., Disp: , Rfl:   •  ibuprofen (ADVIL,MOTRIN) 200 MG tablet, Take 200 mg by mouth Every 6 (Six) Hours As Needed for Mild Pain ., Disp: , Rfl:   •  meloxicam (MOBIC) 15 MG tablet, TAKE 1 TABLET(S) ORAL AS PRESCRIBED ONCE A DAY AS NEEDED WITH FOOD FOR PAIN., Disp: , Rfl:   •  ondansetron ODT (Zofran ODT) 4 MG disintegrating tablet, Place 1 tablet on the tongue Every 6 (Six) Hours As Needed for Nausea., Disp: 10 tablet, Rfl: 0  •  vitamin D (ERGOCALCIFEROL) 1.25 MG (13681 UT) capsule capsule, Take 1 capsule by mouth 1 (One) Time Per Week., Disp: 6 capsule, Rfl: 0    Allergies:   No Known Allergies     Objective       Mental Status  Exam:   Hygiene:   good  Cooperation:  Cooperative  Eye Contact:  Good  Psychomotor Behavior:  Appropriate  Affect:  Appropriate  Mood: depressed and anxious  Hopelessness: 5  Speech:  Normal  Thought Process:  Goal directed  Thought Content:  Mood congruent  Suicidal:  None  Homicidal:  None  Hallucinations:  None  Delusion:  None  Memory:  Intact  Orientation:  Person, Place, Time and Situation  Reliability:  good  Insight:  Good  Judgement:  Good  Impulse Control:  Good    Assessment / Plan      Visit Diagnosis/Orders Placed This Visit:    ICD-10-CM ICD-9-CM   1. Depression with anxiety  F41.8 300.4        PLAN:    Progress toward goal: Not at goal    Prognosis: Good with ongoing treatment    1. Safety: No acute safety concerns  2. Risk Assessment: Risk of self-harm acutely is low. Risk of self-harm chronically is also low, but could be further elevated in the event of treatment noncompliance and/or AODA.    Treatment Plan/Goals: Continue supportive psychotherapy efforts and medications as indicated. Treatment and medication options discussed during today's visit. Patient acknowledged and verbally consented to continue with current treatment plan and was educated on the importance of compliance with treatment and follow-up appointments. Patient seems reasonably able to adhere to treatment plan.      Assisted Patient in processing above session content; acknowledged and normalized patient’s thoughts, feelings, and concerns.  Rationalized patient thought process regarding her worries concerning her family.      Allowed Patient to freely discuss issues  without interruption or judgement with unconditional positive regard, active listening skills, and empathy. Therapist provided a safe, confidential environment to facilitate the development of a positive therapeutic relationship and encouraged open, honest communication. Assisted Patient in identifying risk factors which would indicate the need for higher level of  care including thoughts to harm self or others and/or self-harming behavior and encouraged Patient to contact this office, call 911, or present to the nearest emergency room should any of these events occur. Discussed crisis intervention services and means to access. Patient adamantly and convincingly denies current suicidal or homicidal ideation or perceptual disturbance. Assisted Patient in processing session content; acknowledged and normalized Patient’s thoughts, feelings, and concerns by utilizing a person-centered approach in efforts to build appropriate rapport and a positive therapeutic relationship with open and honest communication.     Follow Up:   Return in about 2 weeks (around 4/4/2023) for Therapy.    This document has been electronically signed by Ember Mcmahon LCSW  March 22, 2023 20:31 EDT

## 2023-03-22 DIAGNOSIS — F90.2 ADHD (ATTENTION DEFICIT HYPERACTIVITY DISORDER), COMBINED TYPE: ICD-10-CM

## 2023-03-22 DIAGNOSIS — F41.9 ANXIETY: ICD-10-CM

## 2023-03-22 RX ORDER — BUPROPION HYDROCHLORIDE 150 MG/1
TABLET ORAL
Qty: 30 TABLET | Refills: 0 | Status: SHIPPED | OUTPATIENT
Start: 2023-03-22 | End: 2023-03-23 | Stop reason: SINTOL

## 2023-03-22 NOTE — TELEPHONE ENCOUNTER
Rx Refill Note  Requested Prescriptions     Pending Prescriptions Disp Refills   • buPROPion XL (WELLBUTRIN XL) 150 MG 24 hr tablet [Pharmacy Med Name: BUPROPION HCL  MG TABLET] 30 tablet 2     Sig: TAKE 1 TABLET BY MOUTH EVERY DAY IN THE MORNING      Last office visit with prescribing clinician: 12/22/2022   Last telemedicine visit with prescribing clinician: 3/23/2023   Next office visit with prescribing clinician: 3/23/2023                         Would you like a call back once the refill request has been completed: [] Yes [] No    If the office needs to give you a call back, can they leave a voicemail: [] Yes [] No    Noreen Quiroz MA  03/22/23, 07:28 EDT

## 2023-03-23 ENCOUNTER — OFFICE VISIT (OUTPATIENT)
Dept: BEHAVIORAL HEALTH | Facility: CLINIC | Age: 23
End: 2023-03-23
Payer: COMMERCIAL

## 2023-03-23 VITALS — WEIGHT: 236 LBS | HEIGHT: 65 IN | BODY MASS INDEX: 39.32 KG/M2

## 2023-03-23 DIAGNOSIS — F90.2 ADHD (ATTENTION DEFICIT HYPERACTIVITY DISORDER), COMBINED TYPE: ICD-10-CM

## 2023-03-23 DIAGNOSIS — F41.9 ANXIETY: Primary | ICD-10-CM

## 2023-03-23 DIAGNOSIS — F33.1 MODERATE EPISODE OF RECURRENT MAJOR DEPRESSIVE DISORDER: ICD-10-CM

## 2023-03-23 PROCEDURE — 1159F MED LIST DOCD IN RCRD: CPT

## 2023-03-23 PROCEDURE — 99214 OFFICE O/P EST MOD 30 MIN: CPT

## 2023-03-23 PROCEDURE — 1160F RVW MEDS BY RX/DR IN RCRD: CPT

## 2023-03-23 RX ORDER — BUSPIRONE HYDROCHLORIDE 5 MG/1
5 TABLET ORAL 2 TIMES DAILY
Qty: 60 TABLET | Refills: 2 | Status: SHIPPED | OUTPATIENT
Start: 2023-03-23

## 2023-03-23 RX ORDER — DESVENLAFAXINE SUCCINATE 50 MG/1
50 TABLET, EXTENDED RELEASE ORAL DAILY
Qty: 30 TABLET | Refills: 2 | Status: SHIPPED | OUTPATIENT
Start: 2023-03-23

## 2023-03-23 NOTE — PROGRESS NOTES
Follow Up Office Visit    Patient Name: Hanny Carbone  : 2000   MRN: [id number]   Care Team: Patient Care Team:  Michelle Kat APRN as PCP - General (Family Medicine)  Amanda Pelaez APRN as Nurse Practitioner (Behavioral Health)        Chief Complaint:    Chief Complaint   Patient presents with   • Anxiety   • Depression   • Med Management       History of Present Illness: Hanny Carbone is a 22 y.o. female who is here today for a medication management follow up. Patient states she hasn't been taking the Wellbutrin because she did not like the way it made her feel. She states the Pristiq and Buspar continue to be effective. She feels that overall she feels that her anxiety and mood are managed well. She has had a couple moments where she gets irritable and down, most the time surrounding her menstrual cycle. She did not see the need for any changes at this time and did not have any medication concerns at today's visit.     The following portion of the patient's history were reviewed and updated appropriately: allergies, current and past medications, family history, medical history and social history.  Subjective   Review of Systems:    Review of Systems   All other systems reviewed and are negative.      Current Medications:   Current Outpatient Medications   Medication Sig Dispense Refill   • busPIRone (BUSPAR) 5 MG tablet Take 1 tablet by mouth 2 (Two) Times a Day. 60 tablet 2   • desvenlafaxine (PRISTIQ) 50 MG 24 hr tablet Take 1 tablet by mouth Daily. INSURANCE PREFERS GENERIC 30 tablet 2   • fexofenadine (ALLEGRA) 60 MG tablet Take 1 tablet by mouth Daily.     • ibuprofen (ADVIL,MOTRIN) 200 MG tablet Take 1 tablet by mouth Every 6 (Six) Hours As Needed for Mild Pain.     • meloxicam (MOBIC) 15 MG tablet TAKE 1 TABLET(S) ORAL AS PRESCRIBED ONCE A DAY AS NEEDED WITH FOOD FOR PAIN.     • ondansetron ODT (Zofran ODT) 4 MG disintegrating tablet Place 1 tablet on the tongue Every 6 (Six) Hours As  "Needed for Nausea. 10 tablet 0   • vitamin D (ERGOCALCIFEROL) 1.25 MG (19044 UT) capsule capsule Take 1 capsule by mouth 1 (One) Time Per Week. 6 capsule 0     No current facility-administered medications for this visit.       Mental Status Exam:   Hygiene:   good  Cooperation:  Cooperative  Eye Contact:  Good  Psychomotor Behavior:  Appropriate  Affect:  Appropriate  Mood: normal  Speech:  Normal  Thought Process:  Goal directed and Linear  Thought Content:  Normal and Mood congruent  Suicidal:  None  Homicidal:  None  Hallucinations:  None  Delusion:  None  Memory:  Intact  Orientation:  Person, Place, Time and Situation  Reliability:  good  Insight:  Good  Judgement:  Good  Impulse Control:  Good  Physical/Medical Issues:  No      Objective   Vital Signs:   Ht 165.1 cm (65\")   Wt 107 kg (236 lb)   BMI 39.27 kg/m²       Assessment / Plan    Diagnoses and all orders for this visit:    1. Anxiety (Primary)  -     busPIRone (BUSPAR) 5 MG tablet; Take 1 tablet by mouth 2 (Two) Times a Day.  Dispense: 60 tablet; Refill: 2  -     desvenlafaxine (PRISTIQ) 50 MG 24 hr tablet; Take 1 tablet by mouth Daily. INSURANCE PREFERS GENERIC  Dispense: 30 tablet; Refill: 2    2. ADHD (attention deficit hyperactivity disorder), combined type    3. Moderate episode of recurrent major depressive disorder (HCC)  -     busPIRone (BUSPAR) 5 MG tablet; Take 1 tablet by mouth 2 (Two) Times a Day.  Dispense: 60 tablet; Refill: 2  -     desvenlafaxine (PRISTIQ) 50 MG 24 hr tablet; Take 1 tablet by mouth Daily. INSURANCE PREFERS GENERIC  Dispense: 30 tablet; Refill: 2       Patient appears to be doing well on current medication. No issues reported and no indication for change. Will continue medication as ordered.     A psychological evaluation was conducted in order to assess past and current level of functioning. Areas assessed included, but were not limited to: perception of social support, perception of ability to face and deal with " challenges in life (positive functioning), anxiety symptoms, depressive symptoms, perspective on beliefs/belief system, coping skills for stress, intelligence level,  Therapeutic rapport was established. Interventions conducted today were geared towards incorporating medication management along with support for continued therapy. Education was also provided as to the med management with this provider and what to expect in subsequent sessions.      We discussed risks, benefits, goals and side effects of the above medication and the patient was agreeable with the plan. Patient was educated on the importance of compliance with treatment and follow-up appointments. Patient is aware to contact the [facility] with any worsening of symptoms. To call for questions or concerns and return early if necessary. Patent is agreeable to go to the Emergency Department or call 911 should they begin SI/HI.      PHQ-2/PHQ-9: Depression Screening  Little Interest or Pleasure in Doing Things: 2-->more than half the days  Feeling Down, Depressed or Hopeless: 2-->more than half the days  PHQ-2 Total Score: 4  Trouble Falling or Staying Asleep, or Sleeping Too Much: 1-->several days  Feeling Tired or Having Little Energy: 1-->several days  Poor Appetite or Overeatin-->more than half the days  Feeling Bad about Yourself - or that You are a Failure or Have Let Yourself or Your Family Down: 1-->several days  Trouble Concentrating on Things, Such as Reading the Newspaper or Watching Television: 1-->several days  Moving or Speaking So Slowly that Other People Could Have Noticed? Or the Opposite - Being So Fidgety: 1-->several days  Thoughts that You Would be Better Off Dead or of Hurting Yourself in Some Way: 1-->several days  PHQ-9: Brief Depression Severity Measure Score: 12  If You Checked Off Any Problems, How Difficult Have These Problems Made It For You to Do Your Work, Take Care of Things at Home, or Get Along with Other People?:  very difficult      PHQ-9 Score:   PHQ-9 Total Score: 12    Depression Screening:  Patient screened positive for depression based on a PHQ-9 score of 12 on 3/23/2023. Follow-up recommendations include: Prescribed antidepressant medication treatment and Suicide Risk Assessment performed.      Over the last two weeks, how often have you been bothered by the following problems?  Feeling nervous, anxious or on edge: Several days  Not being able to stop or control worrying: More than half the days  Worrying too much about different things: More than half the days  Trouble Relaxing: More than half the days  Being so restless that it is hard to sit still: Several days  Becoming easily annoyed or irritable: Several days  Feeling afraid as if something awful might happen: Not at all  JAIME 7 Total Score: 9        Screening for Adults With ADHD - (1-6)  1. How often do you have trouble wrapping up the final details of a project, once the challenging parts have been done?: Often  2. How often do you have difficulty getting things in order when you have to do a task that requires organization?: Often  3. How often do you have problems remembering appointments or obligations : Rarely  4. When you have a task that requires a lot of thought, how often do you avoid or delay getting started ?: Very Often  5. How often do you fidget or squirm with your hands or feet when you have to sit down for a long time?: Very Often  6. How often do you feel overly active and compelled to do things, like you were driven by a motor?: Sometimes  7. How often do you make careless mistakes when you have to work on a boring or difficult project?: Often  8. How often do have difficulty keeping your attention when you are doing boring or repetitive work?: Often  9. How often do you have difficulty concentrating on what people say to you, even when they are speaking to you: Very Often  10.How often do you misplace or have difficulty finding things at home  or at work?: Sometimes  11.How often are you distracted by activity or noise around you?: Very Often  12.How often do you leave your seat in meetings or other situations in which you are expected to remain seated?: Very Often  13.How often do you feel restless or fidgety?: Often  14.How often do you have difficulty unwinding and relaxing when you have time to yourself?: Very Often  15.How often do you find yourself talking too much when you are in social situations?: Very Often  16.When you’re in a conversation, how often do you find yourself finishing the sentences of the people you are talking to, before they can finish them themselves?: Often  17.How often do you have difficulty waiting your turn in situations when turn taking is required?: Often  18.How often do you interrupt others when they are busy?: Sometimes        MEDS ORDERED DURING VISIT:  New Medications Ordered This Visit   Medications   • busPIRone (BUSPAR) 5 MG tablet     Sig: Take 1 tablet by mouth 2 (Two) Times a Day.     Dispense:  60 tablet     Refill:  2   • desvenlafaxine (PRISTIQ) 50 MG 24 hr tablet     Sig: Take 1 tablet by mouth Daily. INSURANCE PREFERS GENERIC     Dispense:  30 tablet     Refill:  2         Follow Up   Return in about 3 months (around 6/23/2023).  Patient was given instructions and counseling regarding her condition or for health maintenance advice. Please see specific information pulled into the AVS if appropriate.     TREATMENT PLAN/GOALS: Continue supportive psychotherapy efforts and medications as indicated. Treatment and medication options discussed during today's visit. Patient acknowledged and verbally consented to continue with current treatment plan and was educated on the importance of compliance with treatment and follow-up appointments.    MEDICATION ISSUES:  Discussed medication options and treatment plan of prescribed medication as well as the risks, benefits, and side effects including potential falls,  possible impaired driving and metabolic adversities among others. Patient is agreeable to call the office with any worsening of symptoms or onset of side effects. Patient is agreeable to call 911 or go to the nearest ER should he/she begin having SI/HI.        [facility] [address] [address]  [phone]    March 23, 2023 17:03 EDT

## 2023-05-05 ENCOUNTER — OFFICE VISIT (OUTPATIENT)
Dept: PSYCHIATRY | Facility: CLINIC | Age: 23
End: 2023-05-05
Payer: COMMERCIAL

## 2023-05-05 DIAGNOSIS — F42.4 EXCORIATION (SKIN-PICKING) DISORDER: ICD-10-CM

## 2023-05-05 DIAGNOSIS — F41.8 DEPRESSION WITH ANXIETY: Primary | ICD-10-CM

## 2023-05-05 NOTE — PROGRESS NOTES
Follow Up Adult Note       Date Encounter: 2023   Name: Hanny Carbone  MRN: [id number]  : 2000    Time In: 7:58 am  Time Out: 9:00 am    Referring Provider: Michelle Kat APRN    Chief Complaint:      ICD-10-CM ICD-9-CM   1. Depression with anxiety  F41.8 300.4   2. Excoriation (skin-picking) disorder  F42.4 698.4        History of Present Illness:   Hanny Carbone is a 22 y.o. female who is being seen today for follow up counseling for depression, anxiety, ADHD, and excoriation disorder.  Patient reports increased stress at work.  She feels that she is managing better than she previously had, but acknowledges that she is still struggling.  She is sleeping more and having a hard time getting to work.  She feels her ADHD and lack of motivation is contributing to her struggles.  Patient reports a long history of skin-picking and recalls pulling two of her teeth when she was 4-5 y.o.  Patient presents her arms and shoulder which have visible scarring.  Discussed possibility of scheduling an appointment with a psychiatrist and referring patient for peer support services.  PHQ-9 score indicates moderate depression.  JAIME-7 score indicates moderate anxiety.  Patient adamantly and convincingly denies current suicidal or homicidal ideation or perceptual disturbance.      Subjective      PHQ-9 Depression Screening  Little interest or pleasure in doing things? 1-->several days   Feeling down, depressed, or hopeless? 1-->several days   Trouble falling or staying asleep, or sleeping too much? 3-->nearly every day   Feeling tired or having little energy? 2-->more than half the days   Poor appetite or overeating? 1-->several days   Feeling bad about yourself - or that you are a failure or have let yourself or your family down? 3-->nearly every day   Trouble concentrating on things, such as reading the newspaper or watching television? 1-->several days   Moving or speaking so slowly that other people could have  noticed? Or the opposite - being so fidgety or restless that you have been moving around a lot more than usual? 1-->several days   Thoughts that you would be better off dead, or of hurting yourself in some way? 1-->several days   PHQ-9 Total Score 14   If you checked off any problems, how difficult have these problems made it for you to do your work, take care of things at home, or get along with other people? very difficult       JAIME-7:  Feeling nervous, anxious or on edge: Nearly every day  Not being able to stop or control worrying: Several days  Worrying too much about different things: More than half the days  Trouble Relaxing: Several days  Being so restless that it is hard to sit still: Nearly every day  Feeling afraid as if something awful might happen: Several days  Becoming easily annoyed or irritable: Nearly every day  JAIME 7 Total Score: 14  If you checked any problems, how difficult have these problems made it for you to do your work, take care of things at home, or get along with other people: Very difficult    Patient's Support Network Includes:  friends    Functional Status: Mild impairment     Medications:     Current Outpatient Medications:   •  busPIRone (BUSPAR) 5 MG tablet, Take 1 tablet by mouth 2 (Two) Times a Day., Disp: 60 tablet, Rfl: 2  •  desvenlafaxine (PRISTIQ) 50 MG 24 hr tablet, Take 1 tablet by mouth Daily. INSURANCE PREFERS GENERIC, Disp: 30 tablet, Rfl: 2  •  fexofenadine (ALLEGRA) 60 MG tablet, Take 1 tablet by mouth Daily., Disp: , Rfl:   •  ibuprofen (ADVIL,MOTRIN) 200 MG tablet, Take 1 tablet by mouth Every 6 (Six) Hours As Needed for Mild Pain., Disp: , Rfl:   •  meloxicam (MOBIC) 15 MG tablet, TAKE 1 TABLET(S) ORAL AS PRESCRIBED ONCE A DAY AS NEEDED WITH FOOD FOR PAIN., Disp: , Rfl:   •  ondansetron ODT (Zofran ODT) 4 MG disintegrating tablet, Place 1 tablet on the tongue Every 6 (Six) Hours As Needed for Nausea., Disp: 10 tablet, Rfl: 0  •  vitamin D (ERGOCALCIFEROL) 1.25 MG  (10802 UT) capsule capsule, Take 1 capsule by mouth 1 (One) Time Per Week., Disp: 6 capsule, Rfl: 0    Allergies:   No Known Allergies     Objective       Mental Status Exam:   Hygiene:   fair  Cooperation:  Cooperative  Eye Contact:  Downcast  Psychomotor Behavior:  Restless  Affect:  tearful  Mood: depressed and anxious  Hopelessness: 5  Speech:  Normal  Thought Process:  Goal directed  Thought Content:  Mood congruent  Suicidal:  None  Homicidal:  None  Hallucinations:  None  Delusion:  None  Memory:  Intact  Orientation:  Person, Place, Time and Situation  Reliability:  good  Insight:  Fair  Judgement:  Fair  Impulse Control:  Fair    Assessment / Plan      Visit Diagnosis/Orders Placed This Visit:    ICD-10-CM ICD-9-CM   1. Depression with anxiety  F41.8 300.4   2. Excoriation (skin-picking) disorder  F42.4 698.4        PLAN:    Progress toward goal: Not at goal    Prognosis: Good with ongoing treatment    1. Safety: Patient denies SI/HI.  Therapist and patient reviewed options for help including 271, 098 the suicide hotline, and going to the neared ER.  Therapist will continue to monitor.  2. Risk Assessment: Risk of self-harm acutely is low. Risk of self-harm chronically is also low, but could be further elevated in the event of treatment noncompliance and/or AODA.    Treatment Plan/Goals: Continue supportive psychotherapy efforts and medications as indicated. Treatment and medication options discussed during today's visit. Patient acknowledged and verbally consented to continue with current treatment plan and was educated on the importance of compliance with treatment and follow-up appointments. Patient seems reasonably able to adhere to treatment plan.      Assisted Patient in processing above session content; acknowledged and normalized patient’s thoughts, feelings, and concerns.  Rationalized patient thought process regarding the progress she's made toward her goals and desires to see continued  improvement.      Allowed Patient to freely discuss issues  without interruption or judgement with unconditional positive regard, active listening skills, and empathy. Therapist provided a safe, confidential environment to facilitate the development of a positive therapeutic relationship and encouraged open, honest communication. Assisted Patient in identifying risk factors which would indicate the need for higher level of care including thoughts to harm self or others and/or self-harming behavior and encouraged Patient to contact this office, call 911, or present to the nearest emergency room should any of these events occur. Discussed crisis intervention services and means to access. Patient adamantly and convincingly denies current suicidal or homicidal ideation or perceptual disturbance. Assisted Patient in processing session content; acknowledged and normalized Patient’s thoughts, feelings, and concerns by utilizing a person-centered approach in efforts to build appropriate rapport and a positive therapeutic relationship with open and honest communication.     Follow Up:   Return in about 2 weeks (around 5/19/2023) for Therapy, Follow up with Dr. Cast.       This document has been electronically signed by Ember Mcmahon LCSW  May 5, 2023 14:01 EDT

## 2023-05-08 ENCOUNTER — OFFICE VISIT (OUTPATIENT)
Dept: PSYCHIATRY | Facility: CLINIC | Age: 23
End: 2023-05-08
Payer: COMMERCIAL

## 2023-05-08 VITALS
BODY MASS INDEX: 38.32 KG/M2 | HEART RATE: 86 BPM | SYSTOLIC BLOOD PRESSURE: 114 MMHG | HEIGHT: 65 IN | WEIGHT: 230 LBS | DIASTOLIC BLOOD PRESSURE: 72 MMHG

## 2023-05-08 DIAGNOSIS — F42.4 EXCORIATION (SKIN-PICKING) DISORDER: Primary | ICD-10-CM

## 2023-05-08 DIAGNOSIS — F41.8 DEPRESSION WITH ANXIETY: ICD-10-CM

## 2023-05-08 NOTE — PROGRESS NOTES
"    Office Note     Name: Hanny Carbone    : 2000     MRN: [id number]     Chief Complaint  Skin picking    Subjective     History of Present Illness: Hanny Carbone presents to [facility] for med management for skin excoriation. She was referred to me by her therapist Vicki Whalen. She used to see [facility] for med management, but reports that coming here is more convenient. She has been previously diagnosed with depression, anxiety, and ADHD. She has always been a nervous person that gets easily overwhelmed. She has always bitten her nails when anxious, but it got worse when she started Vyvanse around . By that point, she had already tried several ADHD medications that she did not tolerate well. She started to pick a lot more at the skin around her face and next to her breasts, \"really, any accessible part of my body\". In particular, she would get hyperfocused about picking the skin at her breast. She has spent up to 4 hours picking at her skin in the past. She has a lot of scarring, especially around her face, and she has previously had \"gaping, open wounds\" that left concern for infection. She currently engages in skin-picking every day. Picking used to be triggered by anxiety, but now it is mostly triggered by seeing any part of her skin in the mirror. She feels restless and uncomfortable until she is able to pick at her skin, and feels some relief after picking. She is currently taking Buspar 5 mg BID (usually taking just once a day) and Pristiq 50 mg daily, and believes that they are effective for anxiety and depression and very well tolerated. She states that about 3 years ago, she used psilocybin, went off of all of her prescribed psychotropic medications, and felt better than she had ever felt. She has recently considered coming off of her medications again, and is open to taking Buspar PRN for now. She is interested in taking something to help " "reduce her skin-picking urges. She currently denies any major issues with sleep, appetite, and energy. Concentration is manageable without medication right now, and she is not interested in getting restarted on ADHD medication. She states that she does occasionally get suicidal thoughts, \"but more like it's just I'm tired, and I could skip out on this day\". Denies any plans/intent, has never attempted. She is able to easily distract herself from any suicidal thoughts. She currently denies SI/HI/AVH.       Prior psych treatment:   Used to do therapy through [facility]. Currently seeing Vicki Whalen at this clinic.     Past medication trials:  Adderall - nausea  Vyvanse - excessive excoriation  Strattera - did not take consistently, not sure if it helped   Effexor - somewhat helpful, but did not take very consistently  Prozac - first antidepressant, ineffective  Guanfacine - made more depressed and tired  Cymbalta - not very helpful, thinks it might have made her sadder    Review of Systems:   Review of Systems   Constitutional: Negative for appetite change, fatigue, unexpected weight gain and unexpected weight loss.   Gastrointestinal: Positive for abdominal pain, nausea and indigestion.   Skin: Positive for skin lesions (multiple, from picking).        Generalized scarring, predominantly on face   Psychiatric/Behavioral: Positive for decreased concentration and self-injury (skin-picking). Negative for hallucinations, sleep disturbance, suicidal ideas and depressed mood. The patient is nervous/anxious.       Past Medical History:   Past Medical History:   Diagnosis Date   • Anemia    • Anxiety    • Depression    • IBS (irritable bowel syndrome)    • Scoliosis        Past Surgical History: History reviewed. No pertinent surgical history.    Medications:     Current Outpatient Medications:   •  busPIRone (BUSPAR) 5 MG tablet, Take 1 tablet by mouth 2 (Two) Times a Day., Disp: 60 tablet, Rfl: 2  •  " "desvenlafaxine (PRISTIQ) 50 MG 24 hr tablet, Take 1 tablet by mouth Daily. INSURANCE PREFERS GENERIC, Disp: 30 tablet, Rfl: 2  •  fexofenadine (ALLEGRA) 60 MG tablet, Take 1 tablet by mouth Daily., Disp: , Rfl:   •  ibuprofen (ADVIL,MOTRIN) 200 MG tablet, Take 1 tablet by mouth Every 6 (Six) Hours As Needed for Mild Pain., Disp: , Rfl:   •  meloxicam (MOBIC) 15 MG tablet, TAKE 1 TABLET(S) ORAL AS PRESCRIBED ONCE A DAY AS NEEDED WITH FOOD FOR PAIN. (Patient not taking: Reported on 5/8/2023), Disp: , Rfl:   •  ondansetron ODT (Zofran ODT) 4 MG disintegrating tablet, Place 1 tablet on the tongue Every 6 (Six) Hours As Needed for Nausea. (Patient not taking: Reported on 5/8/2023), Disp: 10 tablet, Rfl: 0  •  vitamin D (ERGOCALCIFEROL) 1.25 MG (14241 UT) capsule capsule, Take 1 capsule by mouth 1 (One) Time Per Week. (Patient not taking: Reported on 5/8/2023), Disp: 6 capsule, Rfl: 0    Allergies:   No Known Allergies    Family History:   Family History   Problem Relation Age of Onset   • Sjogren's syndrome Mother    • Lupus Mother    • Fibromyalgia Mother    • Osteosarcoma Brother    • Diabetes Other    • Mental illness Other    • Migraines Other    • Obesity Other        Social History:   Social History     Socioeconomic History   • Marital status: Single   Tobacco Use   • Smoking status: Former     Packs/day: 0.25     Years: 0.50     Pack years: 0.13     Types: Cigarettes     Quit date: 12/2019     Years since quitting: 3.4     Passive exposure: Past   • Smokeless tobacco: Never   Vaping Use   • Vaping Use: Every day   • Substances: Nicotine   • Devices: Disposable   • Passive vaping exposure: Yes   Substance and Sexual Activity   • Alcohol use: Not Currently   • Drug use: Yes     Types: Marijuana     Comment: 0.5 g   • Sexual activity: Defer       Objective      Vital Signs:   /72   Pulse 86   Ht 165.1 cm (65\")   Wt 104 kg (230 lb)   BMI 38.27 kg/m²       PHQ-9 Score:   PHQ-9 Total Score: 14 " "    JAIME-7  Feeling nervous, anxious or on edge: More than half the days  Not being able to stop or control worrying: More than half the days  Worrying too much about different things: More than half the days  Trouble Relaxing: Several days  Being so restless that it is hard to sit still: Several days  Feeling afraid as if something awful might happen: Several days  Becoming easily annoyed or irritable: More than half the days  JAIME 7 Total Score: 11  If you checked any problems, how difficult have these problems made it for you to do your work, take care of things at home, or get along with other people: Very difficult    Mental Status Exam:   Hygiene:   good  Cooperation:  Cooperative  Eye Contact:  Good  Psychomotor Behavior:  Restless  Affect:  mildly restricted and mildly anxious  Mood: \"pretty good\"  Speech:  Normal  Thought Process:  Goal directed and Linear  Thought Content:  Normal  Suicidal:  None  Homicidal:  None  Hallucinations:  None  Delusion:  None  Memory:  Intact  Orientation:  Person, Place, Time and Situation  Reliability:  good  Insight:  Good  Judgement:  Good  Impulse Control:  Impaired  Physical/Medical Issues:  Yes lesions from skin-picking   Gait: steady and stable    Current Medications:   Current Outpatient Medications   Medication Sig Dispense Refill   • busPIRone (BUSPAR) 5 MG tablet Take 1 tablet by mouth 2 (Two) Times a Day. 60 tablet 2   • desvenlafaxine (PRISTIQ) 50 MG 24 hr tablet Take 1 tablet by mouth Daily. INSURANCE PREFERS GENERIC 30 tablet 2   • fexofenadine (ALLEGRA) 60 MG tablet Take 1 tablet by mouth Daily.     • ibuprofen (ADVIL,MOTRIN) 200 MG tablet Take 1 tablet by mouth Every 6 (Six) Hours As Needed for Mild Pain.     • meloxicam (MOBIC) 15 MG tablet TAKE 1 TABLET(S) ORAL AS PRESCRIBED ONCE A DAY AS NEEDED WITH FOOD FOR PAIN. (Patient not taking: Reported on 5/8/2023)     • ondansetron ODT (Zofran ODT) 4 MG disintegrating tablet Place 1 tablet on the tongue Every 6 (Six) " Hours As Needed for Nausea. (Patient not taking: Reported on 5/8/2023) 10 tablet 0   • vitamin D (ERGOCALCIFEROL) 1.25 MG (48078 UT) capsule capsule Take 1 capsule by mouth 1 (One) Time Per Week. (Patient not taking: Reported on 5/8/2023) 6 capsule 0     No current facility-administered medications for this visit.       Physical Exam  Constitutional:       General: She is not in acute distress.     Appearance: Normal appearance.   HENT:      Head: Normocephalic and atraumatic.   Eyes:      General: No scleral icterus.     Extraocular Movements: Extraocular movements intact.      Conjunctiva/sclera: Conjunctivae normal.   Pulmonary:      Effort: Pulmonary effort is normal. No respiratory distress.   Skin:     Findings: Lesion (small lesions visible on face from skin-picking) present.   Neurological:      General: No focal deficit present.      Mental Status: She is alert and oriented to person, place, and time.         Assessment and Plan         Diagnosis Plan   1. Excoriation (skin-picking) disorder        2. Depression with anxiety          Hanny Carbone presents to clinic for medication management for skin picking. She has a history of depression, anxiety, and ADHD, but reports that these have been fairly manageable recently with her current regimen of Buspar and Pristiq. She endorses a long history of skin picking that worsened after starting Vyvanse for management of ADHD around 2021. She has spent up to 4 hours picking at her skin in the past, mainly on her face and around her breasts. She is triggered to pick by seeing her skin in the mirror, reports heightened discomfort until she is able to pick, and relief after completing the picking. She has extensive scarring as well as fresh lesions from skin-picking visible today. Although she is interested in coming off of her current medications due to history of doing well off of medications several years ago, she would like to start treatment for skin-picking. Will  start trial of NAC for skin-picking and reduce Buspar to PRN use for anxiety.     - Start NAC 1200 mg daily for excoriation disorder, then increase to 2400 mg daily after 2 weeks if tolerating without issue. Insurance will not cover rx - discussed online sources for NAC with pt, and she plans to purchase from [employer]  - Switch Buspar from 5 mg BID to 5 mg BID PRN anxiety (will not send in new rx, pt has adequate supply).  - Continue Pristiq 50 mg daily for depression and anxiety without change for now. Pt encouraged to discuss with Barbara Pelaez if she desires to come off of this medication in the future.  - PASTOR reviewed - no concerns  - Continue psychotherapy with Vicki Whalen as scheduled.  - Pt may continue outpatient medication management with Barbara Pelaez as scheduled. May follow up with me PRN.       TREATMENT PLAN/GOALS: Continue supportive psychotherapy efforts and medications as indicated. Treatment and medication options discussed during today's visit. Patient ackowledged and verbally consented to continue with current treatment plan and was educated on the importance of compliance with treatment and follow-up appointments.    DEPRESSION:  Patient screened positive for depression based on a PHQ-9 score of 14 on 5/8/2023. Follow-up recommendations include: Suicide Risk Assessment performed.       MEDICATION ISSUES:  We discussed risks, benefits, and side effects of the above medications and the patient was agreeable with the plan. Patient was educated on the importance of compliance with treatment and follow-up appointments.  Patient is agreeable to call the office with any worsening of symptoms or onset of side effects. Patient is agreeable to call 911 or go to the nearest ER should he/she begin having SI/HI.      Counseled patient regarding multimodal approach with healthy nutrition, healthy sleep, regular physical activity, social activities, counseling, and medications.      MEDS ORDERED DURING  VISIT:  No orders of the defined types were placed in this encounter.      Follow Up   No follow-ups on file.    Patient was given instructions and counseling regarding her condition or for health maintenance advice. Please see specific information pulled into the AVS if appropriate.       This document has been electronically signed by Catherine Cast MD  May 8, 2023 10:06 EDT

## 2023-08-14 ENCOUNTER — TELEPHONE (OUTPATIENT)
Dept: INTERNAL MEDICINE | Facility: CLINIC | Age: 23
End: 2023-08-14
Payer: COMMERCIAL

## 2023-08-14 DIAGNOSIS — F41.1 GENERALIZED ANXIETY DISORDER: Primary | ICD-10-CM

## 2023-08-14 DIAGNOSIS — F41.0 PANIC ATTACKS: ICD-10-CM

## 2023-08-14 RX ORDER — PROPRANOLOL HYDROCHLORIDE 10 MG/1
10 TABLET ORAL 2 TIMES DAILY PRN
Qty: 60 TABLET | Refills: 0 | Status: SHIPPED | OUTPATIENT
Start: 2023-08-14

## 2023-08-14 NOTE — TELEPHONE ENCOUNTER
Patient states that her and Barbara have talked about possibly trying a beta blocker.  She is asking if she could get started on one of those medications.  Please advise.  Phone number verified.

## 2023-08-29 ENCOUNTER — OFFICE VISIT (OUTPATIENT)
Dept: BEHAVIORAL HEALTH | Facility: CLINIC | Age: 23
End: 2023-08-29
Payer: COMMERCIAL

## 2023-08-29 VITALS — WEIGHT: 230 LBS | HEIGHT: 65 IN | BODY MASS INDEX: 38.32 KG/M2

## 2023-08-29 DIAGNOSIS — F41.9 ANXIETY: ICD-10-CM

## 2023-08-29 DIAGNOSIS — F41.0 PANIC ATTACKS: ICD-10-CM

## 2023-08-29 DIAGNOSIS — F90.2 ADHD (ATTENTION DEFICIT HYPERACTIVITY DISORDER), COMBINED TYPE: Primary | ICD-10-CM

## 2023-08-29 DIAGNOSIS — F41.1 GENERALIZED ANXIETY DISORDER: ICD-10-CM

## 2023-08-29 DIAGNOSIS — F33.1 MODERATE EPISODE OF RECURRENT MAJOR DEPRESSIVE DISORDER: ICD-10-CM

## 2023-08-29 PROCEDURE — 99214 OFFICE O/P EST MOD 30 MIN: CPT

## 2023-08-29 RX ORDER — PROPRANOLOL HYDROCHLORIDE 10 MG/1
10 TABLET ORAL 2 TIMES DAILY PRN
Qty: 60 TABLET | Refills: 1 | Status: SHIPPED | OUTPATIENT
Start: 2023-08-29

## 2023-08-29 RX ORDER — BUSPIRONE HYDROCHLORIDE 5 MG/1
5 TABLET ORAL DAILY
Qty: 30 TABLET | Refills: 1 | Status: SHIPPED | OUTPATIENT
Start: 2023-08-29

## 2023-08-29 NOTE — PROGRESS NOTES
Follow Up Office Visit    Patient Name: Hanny Carbone  : 2000   MRN: [id number]   Care Team: Patient Care Team:  Michelle Kat APRN as PCP - General (Family Medicine)  Amanda Pelaez APRN as Nurse Practitioner (Behavioral Health)         Chief Complaint:    Chief Complaint   Patient presents with    ADHD    Anxiety    Depression    Med Management       History of Present Illness: Hanny Carbone is a 23 y.o. female who is here today for a medication management follow up. Patient reports that she has successfully weaned off the Pristiq and denies any complications with that. She feels that since being off of it, she is doing much better. She still struggles with anxiety at times, but feels that the Propanolol is helpful with that. She is unsure how effective the Jornay is as she has had a hard time getting in a routine of taking it at night. She denies SI/HI today.     The following portion of the patient's history were reviewed and updated appropriately: allergies, current and past medications, family history, medical history and social history.  Subjective   Review of Systems:    Review of Systems   Psychiatric/Behavioral:  The patient is nervous/anxious.      Current Medications:   Current Outpatient Medications   Medication Sig Dispense Refill    busPIRone (BUSPAR) 5 MG tablet Take 1 tablet by mouth Daily. 30 tablet 1    Methylphenidate HCl ER, PM, (Jornay PM) 20 MG capsule sustained-release 24 hr Take 1 capsule by mouth Every Night. 30 capsule 0    ondansetron ODT (ZOFRAN-ODT) 8 MG disintegrating tablet Place 1 tablet on the tongue Every 8 (Eight) Hours As Needed for Nausea or Vomiting. 21 tablet 0    propranolol (INDERAL) 10 MG tablet Take 1 tablet by mouth 2 (Two) Times a Day As Needed (anxiety/panic). 60 tablet 1     No current facility-administered medications for this visit.       Mental Status Exam:   Hygiene:   good  Cooperation:  Cooperative  Eye Contact:  Good  Psychomotor Behavior:   "Appropriate  Affect:  Appropriate  Mood: normal  Speech:  Normal  Thought Process:  Goal directed and Linear  Thought Content:  Normal and Mood congruent  Suicidal:  None  Homicidal:  None  Hallucinations:  None  Delusion:  None  Memory:  Intact  Orientation:  Person, Place, Time, and Situation  Reliability:  good  Insight:  Good  Judgement:  Good  Impulse Control:  Good  Physical/Medical Issues:  No      Objective   Vital Signs:   Ht 165.1 cm (65\")   Wt 104 kg (230 lb)   BMI 38.27 kg/mý       Assessment / Plan    Diagnoses and all orders for this visit:    1. ADHD (attention deficit hyperactivity disorder), combined type (Primary)   - Continue Jornay 20 mg  2. Moderate episode of recurrent major depressive disorder  -     busPIRone (BUSPAR) 5 MG tablet; Take 1 tablet by mouth Daily.  Dispense: 30 tablet; Refill: 1    3. Anxiety  -     busPIRone (BUSPAR) 5 MG tablet; Take 1 tablet by mouth Daily.  Dispense: 30 tablet; Refill: 1    4. Generalized anxiety disorder  -     propranolol (INDERAL) 10 MG tablet; Take 1 tablet by mouth 2 (Two) Times a Day As Needed (anxiety/panic).  Dispense: 60 tablet; Refill: 1    5. Panic attacks  -     propranolol (INDERAL) 10 MG tablet; Take 1 tablet by mouth 2 (Two) Times a Day As Needed (anxiety/panic).  Dispense: 60 tablet; Refill: 1       Overall, patient appears to be doing well on current medication. Encouraged patient to take Buspar daily to help with underlying anxiety. Continue Jornay as ordered.       A psychological evaluation was conducted in order to assess past and current level of functioning. Areas assessed included, but were not limited to: perception of social support, perception of ability to face and deal with challenges in life (positive functioning), anxiety symptoms, depressive symptoms, perspective on beliefs/belief system, coping skills for stress, intelligence level,  Therapeutic rapport was established. Interventions conducted today were geared towards " incorporating medication management along with support for continued therapy. Education was also provided as to the med management with this provider and what to expect in subsequent sessions.      We discussed risks, benefits, goals and side effects of the above medication and the patient was agreeable with the plan. Patient was educated on the importance of compliance with treatment and follow-up appointments. Patient is aware to contact the [facility] with any worsening of symptoms. To call for questions or concerns and return early if necessary. Patent is agreeable to go to the Emergency Department or call 911 should they begin SI/HI.      PHQ-2/PHQ-9: Depression Screening  Little Interest or Pleasure in Doing Things: 1-->several days  Feeling Down, Depressed or Hopeless: 1-->several days  PHQ-2 Total Score: 2  Trouble Falling or Staying Asleep, or Sleeping Too Much: 1-->several days  Feeling Tired or Having Little Energy: 1-->several days  Poor Appetite or Overeatin-->more than half the days  Feeling Bad about Yourself - or that You are a Failure or Have Let Yourself or Your Family Down: 0-->not at all  Trouble Concentrating on Things, Such as Reading the Newspaper or Watching Television: 2-->more than half the days  Moving or Speaking So Slowly that Other People Could Have Noticed? Or the Opposite - Being So Fidgety: 2-->more than half the days  Thoughts that You Would be Better Off Dead or of Hurting Yourself in Some Way: 1-->several days  PHQ-9: Brief Depression Severity Measure Score: 11  If You Checked Off Any Problems, How Difficult Have These Problems Made It For You to Do Your Work, Take Care of Things at Home, or Get Along with Other People?: somewhat difficult      PHQ-9 Score:   PHQ-9 Total Score: 11    Depression Screening:  Patient screened positive for depression based on a PHQ-9 score of 11 on 2023. Follow-up recommendations include: Prescribed antidepressant medication treatment and  Suicide Risk Assessment performed.      Over the last two weeks, how often have you been bothered by the following problems?  Feeling nervous, anxious or on edge: Nearly every day  Not being able to stop or control worrying: More than half the days  Worrying too much about different things: More than half the days  Trouble Relaxing: More than half the days  Being so restless that it is hard to sit still: More than half the days  Becoming easily annoyed or irritable: More than half the days  Feeling afraid as if something awful might happen: More than half the days  JAIME 7 Total Score: 15  If you checked any problems, how difficult have these problems made it for you to do your work, take care of things at home, or get along with other people: Somewhat difficult        Screening for Adults With ADHD - (1-6)  1. How often do you have trouble wrapping up the final details of a project, once the challenging parts have been done?: Sometimes  2. How often do you have difficulty getting things in order when you have to do a task that requires organization?: Often  3. How often do you have problems remembering appointments or obligations : Rarely  4. When you have a task that requires a lot of thought, how often do you avoid or delay getting started ?: Often  5. How often do you fidget or squirm with your hands or feet when you have to sit down for a long time?: Very Often  6. How often do you feel overly active and compelled to do things, like you were driven by a motor?: Sometimes  7. How often do you make careless mistakes when you have to work on a boring or difficult project?: Often  8. How often do have difficulty keeping your attention when you are doing boring or repetitive work?: Often  9. How often do you have difficulty concentrating on what people say to you, even when they are speaking to you: Very Often  10.How often do you misplace or have difficulty finding things at home or at work?: Sometimes  11.How often  are you distracted by activity or noise around you?: Often  12.How often do you leave your seat in meetings or other situations in which you are expected to remain seated?: Sometimes  13.How often do you feel restless or fidgety?: Very Often  14.How often do you have difficulty unwinding and relaxing when you have time to yourself?: Very Often  15.How often do you find yourself talking too much when you are in social situations?: Sometimes  16.When you're in a conversation, how often do you find yourself finishing the sentences of the people you are talking to, before they can finish them themselves?: Often  17.How often do you have difficulty waiting your turn in situations when turn taking is required?: Sometimes  18.How often do you interrupt others when they are busy?: Sometimes        MEDS ORDERED DURING VISIT:  New Medications Ordered This Visit   Medications    busPIRone (BUSPAR) 5 MG tablet     Sig: Take 1 tablet by mouth Daily.     Dispense:  30 tablet     Refill:  1    propranolol (INDERAL) 10 MG tablet     Sig: Take 1 tablet by mouth 2 (Two) Times a Day As Needed (anxiety/panic).     Dispense:  60 tablet     Refill:  1         Follow Up   Return in about 8 weeks (around 10/24/2023).  Patient was given instructions and counseling regarding her condition or for health maintenance advice. Please see specific information pulled into the AVS if appropriate.     TREATMENT PLAN/GOALS: Continue supportive psychotherapy efforts and medications as indicated. Treatment and medication options discussed during today's visit. Patient acknowledged and verbally consented to continue with current treatment plan and was educated on the importance of compliance with treatment and follow-up appointments.    MEDICATION ISSUES:  Discussed medication options and treatment plan of prescribed medication as well as the risks, benefits, and side effects including potential falls, possible impaired driving and metabolic adversities  among others. Patient is agreeable to call the office with any worsening of symptoms or onset of side effects. Patient is agreeable to call 911 or go to the nearest ER should he/she begin having SI/HI.        [facility]  [address] [place] [id number]  [phone]    August 29, 2023 16:50 EDT